# Patient Record
Sex: MALE | Race: WHITE | NOT HISPANIC OR LATINO
[De-identification: names, ages, dates, MRNs, and addresses within clinical notes are randomized per-mention and may not be internally consistent; named-entity substitution may affect disease eponyms.]

---

## 2018-05-21 ENCOUNTER — APPOINTMENT (OUTPATIENT)
Dept: SURGERY | Facility: CLINIC | Age: 53
End: 2018-05-21
Payer: COMMERCIAL

## 2018-05-21 VITALS
DIASTOLIC BLOOD PRESSURE: 110 MMHG | WEIGHT: 240 LBS | BODY MASS INDEX: 35.55 KG/M2 | HEART RATE: 77 BPM | TEMPERATURE: 98.7 F | SYSTOLIC BLOOD PRESSURE: 193 MMHG | OXYGEN SATURATION: 96 % | HEIGHT: 69 IN

## 2018-05-21 VITALS — SYSTOLIC BLOOD PRESSURE: 190 MMHG | DIASTOLIC BLOOD PRESSURE: 122 MMHG

## 2018-05-21 DIAGNOSIS — Z83.42 FAMILY HISTORY OF FAMILIAL HYPERCHOLESTEROLEMIA: ICD-10-CM

## 2018-05-21 DIAGNOSIS — Z83.511 FAMILY HISTORY OF GLAUCOMA: ICD-10-CM

## 2018-05-21 DIAGNOSIS — Z82.49 FAMILY HISTORY OF ISCHEMIC HEART DISEASE AND OTHER DISEASES OF THE CIRCULATORY SYSTEM: ICD-10-CM

## 2018-05-21 DIAGNOSIS — Z86.79 PERSONAL HISTORY OF OTHER DISEASES OF THE CIRCULATORY SYSTEM: ICD-10-CM

## 2018-05-21 DIAGNOSIS — Z83.3 FAMILY HISTORY OF DIABETES MELLITUS: ICD-10-CM

## 2018-05-21 DIAGNOSIS — Z78.9 OTHER SPECIFIED HEALTH STATUS: ICD-10-CM

## 2018-05-21 PROCEDURE — 99244 OFF/OP CNSLTJ NEW/EST MOD 40: CPT

## 2018-05-25 ENCOUNTER — OUTPATIENT (OUTPATIENT)
Dept: OUTPATIENT SERVICES | Facility: HOSPITAL | Age: 53
LOS: 1 days | End: 2018-05-25
Payer: COMMERCIAL

## 2018-05-25 ENCOUNTER — APPOINTMENT (OUTPATIENT)
Dept: CT IMAGING | Facility: CLINIC | Age: 53
End: 2018-05-25
Payer: COMMERCIAL

## 2018-05-25 DIAGNOSIS — R19.00 INTRA-ABDOMINAL AND PELVIC SWELLING, MASS AND LUMP, UNSPECIFIED SITE: ICD-10-CM

## 2018-05-25 DIAGNOSIS — R10.31 RIGHT LOWER QUADRANT PAIN: ICD-10-CM

## 2018-05-25 PROCEDURE — 74177 CT ABD & PELVIS W/CONTRAST: CPT | Mod: 26

## 2018-05-25 PROCEDURE — 74177 CT ABD & PELVIS W/CONTRAST: CPT

## 2018-05-25 PROCEDURE — 82565 ASSAY OF CREATININE: CPT

## 2018-06-15 ENCOUNTER — APPOINTMENT (OUTPATIENT)
Dept: SURGERY | Facility: CLINIC | Age: 53
End: 2018-06-15
Payer: COMMERCIAL

## 2018-06-15 VITALS
RESPIRATION RATE: 16 BRPM | SYSTOLIC BLOOD PRESSURE: 155 MMHG | HEART RATE: 79 BPM | OXYGEN SATURATION: 98 % | HEIGHT: 69 IN | TEMPERATURE: 98.5 F | BODY MASS INDEX: 35.55 KG/M2 | WEIGHT: 240 LBS | DIASTOLIC BLOOD PRESSURE: 76 MMHG

## 2018-06-15 VITALS — SYSTOLIC BLOOD PRESSURE: 147 MMHG | DIASTOLIC BLOOD PRESSURE: 91 MMHG

## 2018-06-15 DIAGNOSIS — K42.9 UMBILICAL HERNIA W/OUT OBSTRUCTION OR GANGRENE: ICD-10-CM

## 2018-06-15 PROCEDURE — 99214 OFFICE O/P EST MOD 30 MIN: CPT

## 2018-09-10 ENCOUNTER — APPOINTMENT (OUTPATIENT)
Dept: SURGERY | Facility: CLINIC | Age: 53
End: 2018-09-10

## 2020-01-06 ENCOUNTER — RESULT CHARGE (OUTPATIENT)
Age: 55
End: 2020-01-06

## 2020-01-06 ENCOUNTER — MED ADMIN CHARGE (OUTPATIENT)
Age: 55
End: 2020-01-06

## 2020-01-06 ENCOUNTER — APPOINTMENT (OUTPATIENT)
Dept: INTERNAL MEDICINE | Facility: CLINIC | Age: 55
End: 2020-01-06
Payer: COMMERCIAL

## 2020-01-06 VITALS
RESPIRATION RATE: 12 BRPM | HEIGHT: 69 IN | WEIGHT: 220 LBS | BODY MASS INDEX: 32.58 KG/M2 | SYSTOLIC BLOOD PRESSURE: 120 MMHG | HEART RATE: 83 BPM | DIASTOLIC BLOOD PRESSURE: 80 MMHG

## 2020-01-06 LAB — GLUCOSE BLDC GLUCOMTR-MCNC: 230

## 2020-01-06 PROCEDURE — 82962 GLUCOSE BLOOD TEST: CPT

## 2020-01-06 PROCEDURE — G0008: CPT

## 2020-01-06 PROCEDURE — 99203 OFFICE O/P NEW LOW 30 MIN: CPT | Mod: 25

## 2020-01-06 PROCEDURE — 90686 IIV4 VACC NO PRSV 0.5 ML IM: CPT

## 2020-01-06 RX ORDER — TOBRAMYCIN AND DEXAMETHASONE 3; 1 MG/ML; MG/ML
0.3-0.1 SUSPENSION/ DROPS OPHTHALMIC
Qty: 5 | Refills: 0 | Status: DISCONTINUED | COMMUNITY
Start: 2019-11-22

## 2020-01-06 RX ORDER — LOSARTAN POTASSIUM AND HYDROCHLOROTHIAZIDE 25; 100 MG/1; MG/1
100-25 TABLET ORAL
Qty: 30 | Refills: 0 | Status: DISCONTINUED | COMMUNITY
Start: 2019-06-17

## 2020-01-06 RX ORDER — ATORVASTATIN CALCIUM 10 MG/1
10 TABLET, FILM COATED ORAL
Qty: 21 | Refills: 0 | Status: DISCONTINUED | COMMUNITY
Start: 2019-10-01

## 2020-01-06 RX ORDER — GEMFIBROZIL 600 MG/1
600 TABLET, FILM COATED ORAL DAILY
Refills: 0 | Status: DISCONTINUED | COMMUNITY
Start: 2019-12-11 | End: 2020-01-06

## 2020-01-07 ENCOUNTER — RESULT REVIEW (OUTPATIENT)
Age: 55
End: 2020-01-07

## 2020-01-07 LAB
CREAT SPEC-SCNC: 75 MG/DL
MICROALBUMIN 24H UR DL<=1MG/L-MCNC: 1.2 MG/DL
MICROALBUMIN/CREAT 24H UR-RTO: 16 MG/G

## 2020-01-07 NOTE — HISTORY OF PRESENT ILLNESS
[FreeTextEntry1] : Follow up T2DM  [de-identified] : -PMH: T2DM, HTN, Transaminitis \par -SH: . 2 children. Account . >4 EtOH Beverages/day. \par \par YOANA is a 54 year M whom is here today for follow up newly Dx T2DM and to establish care with a new PMD\par Today, pt reports feeling well and is w/o complaints. \par \par -Vaccines: Needs PPSV 23, Shingles, TDap\par \par -T2DM: Currently on Metformin 500mg BID. (12/2019) A1c 12.1. This value is up from 1 year ago A1c 5.8. Reports that over the past few months their have been several life style changes with regards to diet and exercise. Not checking blood sugars at home. \par -HTN: Amlodipine 5mg, HCTZ 25mg & Losartan 100mg.\par -Transaminitis: Had CT Abd 2018 which demonstrated diffuse steatosis. Has not seen hepatologist in 10+ years.

## 2020-01-07 NOTE — HISTORY OF PRESENT ILLNESS
[FreeTextEntry1] : Follow up T2DM  [de-identified] : -PMH: T2DM, HTN, Transaminitis \par -SH: . 2 children. Account . >4 EtOH Beverages/day. \par \par YOANA is a 54 year M whom is here today for follow up newly Dx T2DM and to establish care with a new PMD\par Today, pt reports feeling well and is w/o complaints. \par \par -Vaccines: Needs PPSV 23, Shingles, TDap\par \par -T2DM: Currently on Metformin 500mg BID. (12/2019) A1c 12.1. This value is up from 1 year ago A1c 5.8. Reports that over the past few months their have been several life style changes with regards to diet and exercise. Not checking blood sugars at home. \par -HTN: Amlodipine 5mg, HCTZ 25mg & Losartan 100mg.\par -Transaminitis: Had CT Abd 2018 which demonstrated diffuse steatosis. Has not seen hepatologist in 10+ years.

## 2020-01-07 NOTE — ASSESSMENT
[FreeTextEntry1] : -PMH: T2DM, HTN, Transaminitis \par -SH: . 2 children. Account . >4 EtOH Beverages/day. \par \par YOANA is a 54 year M whom is here today for follow up newly Dx T2DM and to establish care with a new PMD\par Today, pt reports feeling well and is w/o complaints. \par \par -Dietary & Life style modifications w/ goal of weight loss as discussed\par -Increase Metofrmin to 1000mg BID\par -START Januvia 25mg QD (Will likely need to increase)\par -STOP Gemfibrozil & START Atorvastatin 40mg QHS. \par -F/u Urine Microalbumin obtained in office today \par -F/u w/ Diabetic Educator\par -RTO 1mo (PPSV 23, Hepatology Ref, Optho Ref, Review Blood sugars, EtOH use?, Umbilical Hernia?)

## 2020-02-04 ENCOUNTER — APPOINTMENT (OUTPATIENT)
Dept: INTERNAL MEDICINE | Facility: CLINIC | Age: 55
End: 2020-02-04

## 2020-02-18 ENCOUNTER — APPOINTMENT (OUTPATIENT)
Dept: INTERNAL MEDICINE | Facility: CLINIC | Age: 55
End: 2020-02-18
Payer: SELF-PAY

## 2020-02-18 VITALS
HEART RATE: 78 BPM | BODY MASS INDEX: 31.84 KG/M2 | WEIGHT: 215 LBS | HEIGHT: 69 IN | RESPIRATION RATE: 12 BRPM | DIASTOLIC BLOOD PRESSURE: 84 MMHG | SYSTOLIC BLOOD PRESSURE: 136 MMHG

## 2020-02-18 DIAGNOSIS — R10.31 RIGHT LOWER QUADRANT PAIN: ICD-10-CM

## 2020-02-18 DIAGNOSIS — G89.29 RIGHT LOWER QUADRANT PAIN: ICD-10-CM

## 2020-02-18 LAB — GLUCOSE BLDC GLUCOMTR-MCNC: 101

## 2020-02-18 PROCEDURE — 36415 COLL VENOUS BLD VENIPUNCTURE: CPT

## 2020-02-18 PROCEDURE — 82962 GLUCOSE BLOOD TEST: CPT

## 2020-02-18 PROCEDURE — 99213 OFFICE O/P EST LOW 20 MIN: CPT | Mod: 25

## 2020-02-18 RX ORDER — SITAGLIPTIN 25 MG/1
25 TABLET, FILM COATED ORAL DAILY
Qty: 30 | Refills: 0 | Status: DISCONTINUED | COMMUNITY
Start: 2020-01-06 | End: 2020-02-18

## 2020-02-18 NOTE — PHYSICAL EXAM
[Well Nourished] : well nourished [Well Developed] : well developed [No Acute Distress] : no acute distress [Well-Appearing] : well-appearing [Normal Sclera/Conjunctiva] : normal sclera/conjunctiva [PERRL] : pupils equal round and reactive to light [Normal Outer Ear/Nose] : the outer ears and nose were normal in appearance [EOMI] : extraocular movements intact [No Lymphadenopathy] : no lymphadenopathy [Normal Oropharynx] : the oropharynx was normal [No JVD] : no jugular venous distention [Supple] : supple [Thyroid Normal, No Nodules] : the thyroid was normal and there were no nodules present [No Respiratory Distress] : no respiratory distress  [Clear to Auscultation] : lungs were clear to auscultation bilaterally [No Accessory Muscle Use] : no accessory muscle use [Regular Rhythm] : with a regular rhythm [Normal Rate] : normal rate  [No Murmur] : no murmur heard [Normal S1, S2] : normal S1 and S2 [No Carotid Bruits] : no carotid bruits [No Varicosities] : no varicosities [No Abdominal Bruit] : a ~M bruit was not heard ~T in the abdomen [Pedal Pulses Present] : the pedal pulses are present [No Extremity Clubbing/Cyanosis] : no extremity clubbing/cyanosis [No Edema] : there was no peripheral edema [No Palpable Aorta] : no palpable aorta [Non Tender] : non-tender [Non-distended] : non-distended [Soft] : abdomen soft [No HSM] : no HSM [No Masses] : no abdominal mass palpated [Normal Bowel Sounds] : normal bowel sounds [Normal Anterior Cervical Nodes] : no anterior cervical lymphadenopathy [Normal Posterior Cervical Nodes] : no posterior cervical lymphadenopathy [No Spinal Tenderness] : no spinal tenderness [No CVA Tenderness] : no CVA  tenderness [Grossly Normal Strength/Tone] : grossly normal strength/tone [No Joint Swelling] : no joint swelling [No Rash] : no rash [No Focal Deficits] : no focal deficits [Coordination Grossly Intact] : coordination grossly intact [Normal Gait] : normal gait [Normal Affect] : the affect was normal [Deep Tendon Reflexes (DTR)] : deep tendon reflexes were 2+ and symmetric [Normal Insight/Judgement] : insight and judgment were intact

## 2020-02-19 LAB
ALBUMIN SERPL ELPH-MCNC: 4.5 G/DL
ALP BLD-CCNC: 77 U/L
ALT SERPL-CCNC: 101 U/L
ANION GAP SERPL CALC-SCNC: 15 MMOL/L
AST SERPL-CCNC: 74 U/L
BILIRUB SERPL-MCNC: 0.6 MG/DL
BUN SERPL-MCNC: 11 MG/DL
CALCIUM SERPL-MCNC: 9.5 MG/DL
CHLORIDE SERPL-SCNC: 102 MMOL/L
CHOLEST SERPL-MCNC: 145 MG/DL
CHOLEST/HDLC SERPL: 3.1 RATIO
CO2 SERPL-SCNC: 25 MMOL/L
CREAT SERPL-MCNC: 0.82 MG/DL
CREAT SPEC-SCNC: 159 MG/DL
ESTIMATED AVERAGE GLUCOSE: 183 MG/DL
GLUCOSE SERPL-MCNC: 103 MG/DL
HBA1C MFR BLD HPLC: 8 %
HDLC SERPL-MCNC: 47 MG/DL
LDLC SERPL CALC-MCNC: 59 MG/DL
MICROALBUMIN 24H UR DL<=1MG/L-MCNC: <1.2 MG/DL
MICROALBUMIN/CREAT 24H UR-RTO: NORMAL MG/G
POTASSIUM SERPL-SCNC: 3.6 MMOL/L
PROT SERPL-MCNC: 7.4 G/DL
SODIUM SERPL-SCNC: 142 MMOL/L
TRIGL SERPL-MCNC: 196 MG/DL

## 2020-02-19 NOTE — HISTORY OF PRESENT ILLNESS
[FreeTextEntry1] : Follow up T2DM  [de-identified] : -PMH: T2DM, HTN, Transaminitis \par -SH: . 2 children. Account . >4 EtOH Beverages/day. \par \par YOANA is a 54 year M whom is here today for follow up newly Dx T2DM\par Today, pt reports feeling well and is w/o complaints. \par \par -T2DM: Currently on Metformin to 1000mg BID which was inc 1mo ago and Januvia 25mg QD which was started 1mo ago. (12/2019) A1c 12.1. This value is up from 1 year ago A1c 5.8. Has continued to implement dietary/lifestyle modifications and has achieved an additional 5lb weight loss since last visit. Still has not picked up his glucometer or followed up with Diabetic educator and stopped taking januvia. \par -HTN/HLD: Currently on Atorvastatin 40mg QHS, Amlodipine 5mg, HCTZ 25mg & Losartan 100mg. \par -Transaminitis: Had CT Abd 2018 which demonstrated diffuse steatosis. Has not seen hepatologist in 10+ years. Consumes EtOH daily.

## 2020-02-19 NOTE — ASSESSMENT
[FreeTextEntry1] : -PMH: T2DM, HTN, Transaminitis \par -SH: . 2 children. Account . >4 EtOH Beverages/day. \par \par YOANA is a 54 year M whom is here today for follow up newly Dx T2DM\par Today, pt reports feeling well and is w/o complaints. \par \par -F/u labs drawn in office today\par -Further recs pending lab results\par -Continue with current medications\par -Dietary & Life style modifications w/ goal of weight loss as discussed\par -F/u w/ Optho for dilated diabetic eye exam\par -RTO 3mo (Hepatology Ref, PPSV 23, TDap, EtOH use?)

## 2020-02-19 NOTE — ADDENDUM
[FreeTextEntry1] : CMP: ALT//74\par A1c: 8.0\par \par #1 Transaminitis likely due to fatty liver disease and possibly als from him daily EtOH consumption. I recommend EtOH cessation and continued dietary/lifestyle modifications with goal of weight loss. Repeat CMP in 3mo. If these changes are not made their is a chance of development of liver cirrohisis.  \par #2 T2DM: Improved control but still not at goal. He can c/w Current Metformin BID. We will repeat labs in 3mo. \par \par in 3mo obtain CMP, CBC w/ Iron studies, TSH, Hepatitis Panel. If no improvement will send for MRI abdomen. Pending those results may need to send to hepatology.

## 2020-02-23 ENCOUNTER — TRANSCRIPTION ENCOUNTER (OUTPATIENT)
Age: 55
End: 2020-02-23

## 2020-02-25 ENCOUNTER — TRANSCRIPTION ENCOUNTER (OUTPATIENT)
Age: 55
End: 2020-02-25

## 2020-03-18 ENCOUNTER — TRANSCRIPTION ENCOUNTER (OUTPATIENT)
Age: 55
End: 2020-03-18

## 2020-04-02 ENCOUNTER — TRANSCRIPTION ENCOUNTER (OUTPATIENT)
Age: 55
End: 2020-04-02

## 2020-04-03 ENCOUNTER — APPOINTMENT (OUTPATIENT)
Dept: INTERNAL MEDICINE | Facility: CLINIC | Age: 55
End: 2020-04-03
Payer: COMMERCIAL

## 2020-04-03 VITALS
BODY MASS INDEX: 31.84 KG/M2 | HEIGHT: 69 IN | SYSTOLIC BLOOD PRESSURE: 140 MMHG | OXYGEN SATURATION: 98 % | WEIGHT: 215 LBS | HEART RATE: 78 BPM | DIASTOLIC BLOOD PRESSURE: 80 MMHG

## 2020-04-03 PROCEDURE — 96372 THER/PROPH/DIAG INJ SC/IM: CPT

## 2020-04-03 PROCEDURE — 99213 OFFICE O/P EST LOW 20 MIN: CPT | Mod: 25

## 2020-04-03 RX ORDER — DEXAMETHASONE SODIUM PHOSPHATE 10 MG/ML
10 INJECTION INTRAMUSCULAR; INTRAVENOUS
Refills: 0 | Status: COMPLETED | OUTPATIENT
Start: 2020-04-03

## 2020-04-03 RX ADMIN — DEXAMETHASONE SODIUM PHOSPHATE 0 MG/ML: 10 INJECTION INTRAMUSCULAR; INTRAVENOUS at 00:00

## 2020-04-03 NOTE — HISTORY OF PRESENT ILLNESS
[Rash (Location) ___] : rash on [unfilled] [Moderate] : moderate [___ Days ago] : [unfilled] days ago [Gradual] : started gradually [Constant] : constant [Cold Compresses] : cold compresses [Worsening] : worsening [de-identified] : Left wrist and left groin [FreeTextEntry1] : Rash began 1 day after outdoor gardening. Has h/o Poision Krystyna related contact dermatitis [FreeTextEntry2] : pruritus and erythema with continued spread [FreeTextEntry5] : Topical Cortisone [FreeTextEntry4] : Nothing particular

## 2020-04-03 NOTE — ASSESSMENT
[FreeTextEntry1] : Findings consistent with poison ivy related contact dermatitis that has continued to spread despite conservative OTC therapies.\par -IM injection of Decadron given in office\par -Patient to continue on prednisone taper over the next 2 weeks\par -Recommend use of OTC antihistamine such as Zyrtec\par -Avoid hot showers\par -Call if symptoms return after completion of prednisone taper as a prolonged prednisone taper may be warranted

## 2020-04-03 NOTE — PHYSICAL EXAM
[Normal] : no jugular venous distention, supple, no lymphadenopathy and the thyroid was normal and there were no nodules present [de-identified] : Erythematous rash left groin and left wrist

## 2020-05-20 ENCOUNTER — TRANSCRIPTION ENCOUNTER (OUTPATIENT)
Age: 55
End: 2020-05-20

## 2020-05-22 ENCOUNTER — APPOINTMENT (OUTPATIENT)
Dept: INTERNAL MEDICINE | Facility: CLINIC | Age: 55
End: 2020-05-22
Payer: COMMERCIAL

## 2020-05-22 VITALS
HEART RATE: 96 BPM | HEIGHT: 69 IN | WEIGHT: 215 LBS | OXYGEN SATURATION: 98 % | DIASTOLIC BLOOD PRESSURE: 76 MMHG | SYSTOLIC BLOOD PRESSURE: 134 MMHG | RESPIRATION RATE: 14 BRPM | BODY MASS INDEX: 31.84 KG/M2

## 2020-05-22 LAB
ALBUMIN SERPL ELPH-MCNC: 4.4 G/DL
ALP BLD-CCNC: 65 U/L
ALT SERPL-CCNC: 53 U/L
ANION GAP SERPL CALC-SCNC: 13 MMOL/L
AST SERPL-CCNC: 51 U/L
BASOPHILS # BLD AUTO: 0.04 K/UL
BASOPHILS NFR BLD AUTO: 0.6 %
BILIRUB SERPL-MCNC: 1 MG/DL
BUN SERPL-MCNC: 15 MG/DL
CALCIUM SERPL-MCNC: 9.3 MG/DL
CHLORIDE SERPL-SCNC: 104 MMOL/L
CO2 SERPL-SCNC: 25 MMOL/L
CREAT SERPL-MCNC: 0.8 MG/DL
EOSINOPHIL # BLD AUTO: 0.49 K/UL
EOSINOPHIL NFR BLD AUTO: 7.7 %
ESTIMATED AVERAGE GLUCOSE: 134 MG/DL
FERRITIN SERPL-MCNC: 286 NG/ML
GLUCOSE SERPL-MCNC: 123 MG/DL
HAV IGM SER QL: NONREACTIVE
HBA1C MFR BLD HPLC: 6.3 %
HBV CORE IGM SER QL: NONREACTIVE
HBV SURFACE AG SER QL: NONREACTIVE
HCT VFR BLD CALC: 43.1 %
HCV AB SER QL: NONREACTIVE
HCV S/CO RATIO: 0.15 S/CO
HGB BLD-MCNC: 14.3 G/DL
IMM GRANULOCYTES NFR BLD AUTO: 0.2 %
IRON SATN MFR SERPL: 50 %
IRON SERPL-MCNC: 156 UG/DL
LYMPHOCYTES # BLD AUTO: 2.44 K/UL
LYMPHOCYTES NFR BLD AUTO: 38.2 %
MAN DIFF?: NORMAL
MCHC RBC-ENTMCNC: 31.1 PG
MCHC RBC-ENTMCNC: 33.2 GM/DL
MCV RBC AUTO: 93.7 FL
MONOCYTES # BLD AUTO: 0.67 K/UL
MONOCYTES NFR BLD AUTO: 10.5 %
NEUTROPHILS # BLD AUTO: 2.73 K/UL
NEUTROPHILS NFR BLD AUTO: 42.8 %
PLATELET # BLD AUTO: 154 K/UL
POTASSIUM SERPL-SCNC: 4.4 MMOL/L
PROT SERPL-MCNC: 6.6 G/DL
RBC # BLD: 4.6 M/UL
RBC # FLD: 12.8 %
SODIUM SERPL-SCNC: 142 MMOL/L
TIBC SERPL-MCNC: 316 UG/DL
TSH SERPL-ACNC: 1.16 UIU/ML
UIBC SERPL-MCNC: 159 UG/DL
WBC # FLD AUTO: 6.38 K/UL

## 2020-05-22 PROCEDURE — 99214 OFFICE O/P EST MOD 30 MIN: CPT

## 2020-05-22 RX ORDER — TRIAMCINOLONE ACETONIDE 1 MG/G
0.1 CREAM TOPICAL 3 TIMES DAILY
Qty: 1 | Refills: 0 | Status: DISCONTINUED | COMMUNITY
Start: 2020-04-03 | End: 2020-05-22

## 2020-05-22 RX ORDER — PREDNISONE 10 MG/1
10 TABLET ORAL
Qty: 18 | Refills: 0 | Status: DISCONTINUED | COMMUNITY
Start: 2020-04-03 | End: 2020-05-22

## 2020-05-22 NOTE — HISTORY OF PRESENT ILLNESS
[FreeTextEntry1] : Follow up T2DM  [de-identified] : -PMH: T2DM, HTN, Transaminitis 2/2 Hepatic Steatosis\par -SH: . 2 children. Account . >4 EtOH Beverages/day. \par \par YOANA is a 54 year M whom is here today for follow up newly Dx T2DM\par Today, pt reports feeling well and is w/o complaints. \par He denies any changes since our last f/u\par Pt obtained labs prior to todays appointment and thus labs were reviewed with patient in detail\par \par -T2DM: (5/2020) A1c: 6.3. Remains on Metformin to 1000mg BID. Does not monitor blood sugars at home. \par -HTN/HLD: Remains on Atorvastatin 40mg QHS, Amlodipine 5mg, HCTZ 25mg & Losartan 100mg. Does not follow w/ cardio. Denies Sxs today.\par -Hepatic Steatosis: (2018) CT Abd demonstrated diffuse steatosis. Consumes EtOH daily.

## 2020-05-22 NOTE — ASSESSMENT
[FreeTextEntry1] : -PMH: T2DM, HTN, Transaminitis 2/2 Hepatic Steatosis\par -SH: . 2 children. Account . >4 EtOH Beverages/day. \par \par YOANA is a 54 year M whom is here today for follow up newly Dx T2DM\par \par -F/u labs drawn in office today\par -Further recs pending lab results\par -Decrease Metformin to 850bid\par -Dietary & Life style modifications w/ goal of weight loss as discussed\par -F/u w/ Optho for dilated diabetic eye exam\par -RTO 3mo (Labs: CMP, A1c, Lipid. Discuss PPSV 23 & TDap. Schedule yearly physical)

## 2020-07-08 ENCOUNTER — APPOINTMENT (OUTPATIENT)
Dept: INTERNAL MEDICINE | Facility: CLINIC | Age: 55
End: 2020-07-08
Payer: COMMERCIAL

## 2020-07-08 VITALS
BODY MASS INDEX: 33.33 KG/M2 | DIASTOLIC BLOOD PRESSURE: 80 MMHG | TEMPERATURE: 98.5 F | WEIGHT: 225 LBS | HEIGHT: 69 IN | SYSTOLIC BLOOD PRESSURE: 125 MMHG | HEART RATE: 90 BPM

## 2020-07-08 PROCEDURE — 99213 OFFICE O/P EST LOW 20 MIN: CPT | Mod: 25

## 2020-07-08 PROCEDURE — 36415 COLL VENOUS BLD VENIPUNCTURE: CPT

## 2020-07-08 RX ORDER — AMLODIPINE BESYLATE 5 MG/1
5 TABLET ORAL DAILY
Qty: 90 | Refills: 1 | Status: DISCONTINUED | COMMUNITY
Start: 2019-12-11 | End: 2020-07-08

## 2020-07-08 RX ORDER — DEXAMETHASONE SODIUM PHOSPHATE 10 MG/ML
10 INJECTION INTRAMUSCULAR; INTRAVENOUS
Refills: 0 | Status: COMPLETED | OUTPATIENT
Start: 2020-07-08

## 2020-07-08 RX ADMIN — DEXAMETHASONE SODIUM PHOSPHATE 0 MG/ML: 10 INJECTION INTRAMUSCULAR; INTRAVENOUS at 00:00

## 2020-07-09 ENCOUNTER — TRANSCRIPTION ENCOUNTER (OUTPATIENT)
Age: 55
End: 2020-07-09

## 2020-07-09 LAB
SARS-COV-2 IGG SERPL IA-ACNC: 71.4 AU/ML
SARS-COV-2 IGG SERPL QL IA: POSITIVE

## 2020-07-09 NOTE — PHYSICAL EXAM
[No Acute Distress] : no acute distress [No Respiratory Distress] : no respiratory distress  [Clear to Auscultation] : lungs were clear to auscultation bilaterally [Normal Rate] : normal rate  [Regular Rhythm] : with a regular rhythm [Normal Affect] : the affect was normal [Normal Mood] : the mood was normal [de-identified] : +Vesicular pustular eruption bilateral arms/lower abdomen, no secondary infection

## 2020-07-09 NOTE — ASSESSMENT
[FreeTextEntry1] : Venipuncture done in our office, Labs sent out.Dexamethasone IM given/ Medrol Dosepak and Cutivate prescribed, patient is aware that steroids can raise his blood sugar but has not had issue in the past w/pred. Patient will call if symptoms persist or worsen and return to the office as scheduled for regular followup\par \par \par Dr. Schrader was present in office building while I examined patient\par

## 2020-07-09 NOTE — HISTORY OF PRESENT ILLNESS
[FreeTextEntry8] : Dr. WAYNE bridges NEW to me presents c/o\par \par #1.Rash bilateral arms/abdomen for 10 days, it is pruritic. The patient was doing yard work. Patient tried a couple leftover prednisone that he had at home. Patient has no systemic symptoms i.e. fever\par \par #2. Request COVID antibody testing while here

## 2020-07-16 ENCOUNTER — TRANSCRIPTION ENCOUNTER (OUTPATIENT)
Age: 55
End: 2020-07-16

## 2020-07-17 ENCOUNTER — TRANSCRIPTION ENCOUNTER (OUTPATIENT)
Age: 55
End: 2020-07-17

## 2020-07-24 ENCOUNTER — APPOINTMENT (OUTPATIENT)
Dept: INTERNAL MEDICINE | Facility: CLINIC | Age: 55
End: 2020-07-24
Payer: COMMERCIAL

## 2020-07-24 VITALS
HEART RATE: 76 BPM | RESPIRATION RATE: 14 BRPM | TEMPERATURE: 97.6 F | HEIGHT: 69 IN | BODY MASS INDEX: 33.47 KG/M2 | SYSTOLIC BLOOD PRESSURE: 142 MMHG | OXYGEN SATURATION: 98 % | DIASTOLIC BLOOD PRESSURE: 80 MMHG | WEIGHT: 226 LBS

## 2020-07-24 PROCEDURE — 96372 THER/PROPH/DIAG INJ SC/IM: CPT

## 2020-07-24 PROCEDURE — 99213 OFFICE O/P EST LOW 20 MIN: CPT | Mod: 25

## 2020-07-24 RX ORDER — METHYLPREDNISOLONE 4 MG/1
4 TABLET ORAL
Qty: 1 | Refills: 0 | Status: DISCONTINUED | COMMUNITY
Start: 2020-07-08 | End: 2020-07-24

## 2020-07-24 RX ORDER — DEXAMETHASONE SODIUM PHOSPHATE 10 MG/ML
10 INJECTION INTRAMUSCULAR; INTRAVENOUS
Refills: 0 | Status: COMPLETED | OUTPATIENT
Start: 2020-07-24

## 2020-07-24 RX ADMIN — DEXAMETHASONE SODIUM PHOSPHATE 0 MG/ML: 10 INJECTION INTRAMUSCULAR; INTRAVENOUS at 00:00

## 2020-07-24 NOTE — HISTORY OF PRESENT ILLNESS
[Rash (Location) ___] : rash on [unfilled] [Mild] : mild [Gradual] : started gradually [___ Weeks ago] :  [unfilled] weeks ago [Antihistamines] : antihistamines [Constant] : constant [Stable] : stable [de-identified] : erythematous rash b/l UE. It started at the beginning of July. He was seen in our office by a covering provider on July 8 whom started pt on medrol dose pack. Pt reports improvement of rash but is still with presence of rash that is pruritic

## 2020-07-24 NOTE — PHYSICAL EXAM
[Normal] : no acute distress, well nourished, well developed and well-appearing [de-identified] : erythemaouts

## 2020-07-24 NOTE — ASSESSMENT
[FreeTextEntry1] : persistent poision ivy w/ some improvement\par Will place on longer steroid taper\par Decadron IM administered in office to  LUE\par Call if fails to resolve with recommended therapy

## 2020-07-29 ENCOUNTER — RX RENEWAL (OUTPATIENT)
Age: 55
End: 2020-07-29

## 2020-10-05 ENCOUNTER — RX RENEWAL (OUTPATIENT)
Age: 55
End: 2020-10-05

## 2020-11-09 ENCOUNTER — RX RENEWAL (OUTPATIENT)
Age: 55
End: 2020-11-09

## 2020-11-10 ENCOUNTER — RX RENEWAL (OUTPATIENT)
Age: 55
End: 2020-11-10

## 2021-01-11 ENCOUNTER — TRANSCRIPTION ENCOUNTER (OUTPATIENT)
Age: 56
End: 2021-01-11

## 2021-01-13 LAB
ALBUMIN SERPL ELPH-MCNC: 4.6 G/DL
ALP BLD-CCNC: 91 U/L
ALT SERPL-CCNC: 90 U/L
ANION GAP SERPL CALC-SCNC: 16 MMOL/L
AST SERPL-CCNC: 61 U/L
BASOPHILS # BLD AUTO: 0.07 K/UL
BASOPHILS NFR BLD AUTO: 1 %
BILIRUB SERPL-MCNC: 0.8 MG/DL
BUN SERPL-MCNC: 12 MG/DL
CALCIUM SERPL-MCNC: 9.9 MG/DL
CHLORIDE SERPL-SCNC: 100 MMOL/L
CHOLEST SERPL-MCNC: 139 MG/DL
CO2 SERPL-SCNC: 24 MMOL/L
CREAT SERPL-MCNC: 0.99 MG/DL
CREAT SPEC-SCNC: 225 MG/DL
EOSINOPHIL # BLD AUTO: 0.19 K/UL
EOSINOPHIL NFR BLD AUTO: 2.8 %
ESTIMATED AVERAGE GLUCOSE: 206 MG/DL
GLUCOSE SERPL-MCNC: 192 MG/DL
HBA1C MFR BLD HPLC: 8.8 %
HCT VFR BLD CALC: 48.3 %
HDLC SERPL-MCNC: 38 MG/DL
HGB BLD-MCNC: 15.9 G/DL
IMM GRANULOCYTES NFR BLD AUTO: 0.1 %
LDLC SERPL CALC-MCNC: 73 MG/DL
LYMPHOCYTES # BLD AUTO: 3.03 K/UL
LYMPHOCYTES NFR BLD AUTO: 45 %
MAN DIFF?: NORMAL
MCHC RBC-ENTMCNC: 30.2 PG
MCHC RBC-ENTMCNC: 32.9 GM/DL
MCV RBC AUTO: 91.7 FL
MICROALBUMIN 24H UR DL<=1MG/L-MCNC: 2.3 MG/DL
MICROALBUMIN/CREAT 24H UR-RTO: 10 MG/G
MONOCYTES # BLD AUTO: 0.83 K/UL
MONOCYTES NFR BLD AUTO: 12.3 %
NEUTROPHILS # BLD AUTO: 2.61 K/UL
NEUTROPHILS NFR BLD AUTO: 38.8 %
NONHDLC SERPL-MCNC: 101 MG/DL
PLATELET # BLD AUTO: 209 K/UL
POTASSIUM SERPL-SCNC: 4.1 MMOL/L
PROT SERPL-MCNC: 7.3 G/DL
PSA SERPL-MCNC: 2.1 NG/ML
RBC # BLD: 5.27 M/UL
RBC # FLD: 12.6 %
SODIUM SERPL-SCNC: 140 MMOL/L
TRIGL SERPL-MCNC: 137 MG/DL
WBC # FLD AUTO: 6.74 K/UL

## 2021-01-18 ENCOUNTER — RX RENEWAL (OUTPATIENT)
Age: 56
End: 2021-01-18

## 2021-01-19 ENCOUNTER — NON-APPOINTMENT (OUTPATIENT)
Age: 56
End: 2021-01-19

## 2021-01-19 ENCOUNTER — APPOINTMENT (OUTPATIENT)
Dept: INTERNAL MEDICINE | Facility: CLINIC | Age: 56
End: 2021-01-19
Payer: COMMERCIAL

## 2021-01-19 VITALS
HEART RATE: 76 BPM | DIASTOLIC BLOOD PRESSURE: 90 MMHG | RESPIRATION RATE: 14 BRPM | WEIGHT: 230 LBS | OXYGEN SATURATION: 96 % | BODY MASS INDEX: 34.07 KG/M2 | SYSTOLIC BLOOD PRESSURE: 134 MMHG | TEMPERATURE: 97.2 F | HEIGHT: 69 IN

## 2021-01-19 DIAGNOSIS — L23.7 ALLERGIC CONTACT DERMATITIS DUE TO PLANTS, EXCEPT FOOD: ICD-10-CM

## 2021-01-19 DIAGNOSIS — Z11.59 ENCOUNTER FOR SCREENING FOR OTHER VIRAL DISEASES: ICD-10-CM

## 2021-01-19 PROCEDURE — G0442 ANNUAL ALCOHOL SCREEN 15 MIN: CPT

## 2021-01-19 PROCEDURE — G0444 DEPRESSION SCREEN ANNUAL: CPT

## 2021-01-19 PROCEDURE — 93000 ELECTROCARDIOGRAM COMPLETE: CPT | Mod: 59

## 2021-01-19 PROCEDURE — 99072 ADDL SUPL MATRL&STAF TM PHE: CPT

## 2021-01-19 PROCEDURE — 99396 PREV VISIT EST AGE 40-64: CPT | Mod: 25

## 2021-01-19 PROCEDURE — 90686 IIV4 VACC NO PRSV 0.5 ML IM: CPT

## 2021-01-19 PROCEDURE — G0008: CPT

## 2021-01-19 RX ORDER — FLUTICASONE PROPIONATE 0.5 MG/G
0.05 CREAM TOPICAL TWICE DAILY
Qty: 1 | Refills: 0 | Status: DISCONTINUED | COMMUNITY
Start: 2020-07-08 | End: 2021-01-19

## 2021-01-19 NOTE — HEALTH RISK ASSESSMENT
[Very Good] : ~his/her~  mood as very good [Yes] : Yes [No] : In the past 12 months have you used drugs other than those required for medical reasons? No [0] : 2) Feeling down, depressed, or hopeless: Not at all (0) [] :  [# Of Children ___] : has [unfilled] children [Reviewed no changes] : Reviewed no changes [No Retinopathy] : No retinopathy [2 - 3 times a week (3 pts)] : 2 - 3  times a week (3 points) [5 or 6 (2 pts)] : 5 or 6 (2  points) [Weekly (3 pts)] : Weekly (3 points) [Patient declined colonoscopy] : Patient declined colonoscopy [With Family] : lives with family [Employed] : employed [] : No [Audit-CScore] : 8 [[id number]] : 0 [EyeExamDate] : 01/20 [Reports changes in hearing] : Reports no changes in hearing [Reports changes in vision] : Reports no changes in vision [ColonoscopyDate] : 01/21 [AdvancecareDate] : 01/21

## 2021-01-19 NOTE — HISTORY OF PRESENT ILLNESS
[FreeTextEntry1] : ANnual well visit [de-identified] : -PMH: T2DM, HTN, Transaminitis 2/2 Hepatic Steatosis\par -SH: . 2 children. Account . >4 EtOH Beverages/day. Non-Smoker. \par \par YOANA is a 55 year M whom is here today for an annual well check\par Today, pt reports feeling well and is w/o complaints. \par He denies any changes since our last f/u visit\par He obtained ordered labs to review at today's visit on 1/13/21. (CBC, PSA, Lipid Panel, CMP, A1c, Urine Microalbumin). Lab results reviewed with patient. \par Consents to Flu vaccine today\par \par Specialists Involved:\par -None\par \par -Vaccines: Needs PPSV 23, Shingles, TDap (Declines at this time)\par -Colonoscopy: Still needs to f/u w/ GI\par -FH of Prostate, Colon, breast or ovarian CA: None known\par \par -T2DM: Remains on Metformin to 850mg BID. On Statin but not Aspirin. Does not monitor blood sugars at home. (1/2021) A1c 8.8. He does admit to poor diet, lack of exercise and weight gain and med non-compliance as contributing factors. (1/2020) Optho Eval: No Retinopathy. No other reported changes. \par \par -HTN: Remains on HCTZ 25mg & Losartan 100mg. Does not follow w/ cardio. No reported changes. \par -HLD: Remains on Atorvastatin 40mg QHS. No reported changes. \par \par -Hepatic Steatosis: (2018) CT Abd demonstrated diffuse steatosis. He admits to consuming EtOH daily. Not interested in cutting back. No other reported changes.

## 2021-01-19 NOTE — PHYSICAL EXAM
[No Acute Distress] : no acute distress [Well Nourished] : well nourished [Well Developed] : well developed [Well-Appearing] : well-appearing [Normal Sclera/Conjunctiva] : normal sclera/conjunctiva [PERRL] : pupils equal round and reactive to light [EOMI] : extraocular movements intact [Normal Outer Ear/Nose] : the outer ears and nose were normal in appearance [Normal Oropharynx] : the oropharynx was normal [No JVD] : no jugular venous distention [No Lymphadenopathy] : no lymphadenopathy [Supple] : supple [Thyroid Normal, No Nodules] : the thyroid was normal and there were no nodules present [No Respiratory Distress] : no respiratory distress  [No Accessory Muscle Use] : no accessory muscle use [Clear to Auscultation] : lungs were clear to auscultation bilaterally [Normal Rate] : normal rate  [Regular Rhythm] : with a regular rhythm [Normal S1, S2] : normal S1 and S2 [No Murmur] : no murmur heard [No Carotid Bruits] : no carotid bruits [No Abdominal Bruit] : a ~M bruit was not heard ~T in the abdomen [No Varicosities] : no varicosities [Pedal Pulses Present] : the pedal pulses are present [No Palpable Aorta] : no palpable aorta [No Edema] : there was no peripheral edema [No Extremity Clubbing/Cyanosis] : no extremity clubbing/cyanosis [Soft] : abdomen soft [Non Tender] : non-tender [Non-distended] : non-distended [No Masses] : no abdominal mass palpated [Normal Bowel Sounds] : normal bowel sounds [No HSM] : no HSM [Normal Posterior Cervical Nodes] : no posterior cervical lymphadenopathy [Normal Anterior Cervical Nodes] : no anterior cervical lymphadenopathy [No CVA Tenderness] : no CVA  tenderness [No Spinal Tenderness] : no spinal tenderness [No Joint Swelling] : no joint swelling [Grossly Normal Strength/Tone] : grossly normal strength/tone [No Rash] : no rash [Coordination Grossly Intact] : coordination grossly intact [No Focal Deficits] : no focal deficits [Normal Gait] : normal gait [Normal Affect] : the affect was normal [Deep Tendon Reflexes (DTR)] : deep tendon reflexes were 2+ and symmetric [Normal Insight/Judgement] : insight and judgment were intact [Comprehensive Foot Exam Normal] : Right and left foot were examined and both feet are normal. No ulcers in either foot. Toes are normal and with full ROM.  Normal tactile sensation with monofilament testing throughout both feet

## 2021-01-19 NOTE — ASSESSMENT
[FreeTextEntry1] : -PMH: T2DM, HTN, Transaminitis 2/2 Hepatic Steatosis\par -SH: . 2 children. Account . >4 EtOH Beverages/day. \par \par YOANA is a 54 year M whom is here today for CPE\par \par Flu Vaccine administered in office today\par \par -F/u w/ Uro (ED non-responsive to Sildenaphil)\par -Still needs to f/u w/ GI (Colonoscopy)\par -START Aspirin 81mg\par -Still needs to f/u w/ Optho (Dilated diabetic eye exam)\par -RTO 3mo (A1c) or sooner if needed PPSV 23, Shingles, TDap

## 2021-05-06 ENCOUNTER — APPOINTMENT (OUTPATIENT)
Dept: DERMATOLOGY | Facility: CLINIC | Age: 56
End: 2021-05-06
Payer: COMMERCIAL

## 2021-05-06 PROCEDURE — 99203 OFFICE O/P NEW LOW 30 MIN: CPT

## 2021-05-06 PROCEDURE — D0125: CPT

## 2021-05-06 PROCEDURE — 99072 ADDL SUPL MATRL&STAF TM PHE: CPT

## 2021-07-20 ENCOUNTER — TRANSCRIPTION ENCOUNTER (OUTPATIENT)
Age: 56
End: 2021-07-20

## 2021-07-21 ENCOUNTER — RX RENEWAL (OUTPATIENT)
Age: 56
End: 2021-07-21

## 2021-07-23 LAB
ALBUMIN SERPL ELPH-MCNC: 4.4 G/DL
ALP BLD-CCNC: 100 U/L
ALT SERPL-CCNC: 63 U/L
ANION GAP SERPL CALC-SCNC: 14 MMOL/L
AST SERPL-CCNC: 39 U/L
BILIRUB SERPL-MCNC: 1.1 MG/DL
BUN SERPL-MCNC: 18 MG/DL
CALCIUM SERPL-MCNC: 9.6 MG/DL
CHLORIDE SERPL-SCNC: 100 MMOL/L
CHOLEST SERPL-MCNC: 148 MG/DL
CO2 SERPL-SCNC: 26 MMOL/L
CREAT SERPL-MCNC: 0.93 MG/DL
ESTIMATED AVERAGE GLUCOSE: 226 MG/DL
GLUCOSE SERPL-MCNC: 212 MG/DL
HBA1C MFR BLD HPLC: 9.5 %
HDLC SERPL-MCNC: 40 MG/DL
LDLC SERPL CALC-MCNC: 66 MG/DL
NONHDLC SERPL-MCNC: 108 MG/DL
POTASSIUM SERPL-SCNC: 3.7 MMOL/L
PROT SERPL-MCNC: 6.9 G/DL
SODIUM SERPL-SCNC: 140 MMOL/L
TRIGL SERPL-MCNC: 207 MG/DL

## 2021-07-26 ENCOUNTER — APPOINTMENT (OUTPATIENT)
Dept: INTERNAL MEDICINE | Facility: CLINIC | Age: 56
End: 2021-07-26
Payer: COMMERCIAL

## 2021-07-26 VITALS
HEART RATE: 101 BPM | DIASTOLIC BLOOD PRESSURE: 76 MMHG | HEIGHT: 69 IN | OXYGEN SATURATION: 98 % | SYSTOLIC BLOOD PRESSURE: 148 MMHG | RESPIRATION RATE: 14 BRPM | BODY MASS INDEX: 33.62 KG/M2 | TEMPERATURE: 96.8 F | WEIGHT: 227 LBS

## 2021-07-26 DIAGNOSIS — Z92.29 PERSONAL HISTORY OF OTHER DRUG THERAPY: ICD-10-CM

## 2021-07-26 PROCEDURE — 99215 OFFICE O/P EST HI 40 MIN: CPT

## 2021-07-26 PROCEDURE — 99072 ADDL SUPL MATRL&STAF TM PHE: CPT

## 2021-07-26 NOTE — ASSESSMENT
[FreeTextEntry1] : -PMH: T2DM, HTN, Transaminitis 2/2 Hepatic Steatosis\par -SH: . 2 children. Account . >4 EtOH Beverages/day. \par \par YOANA is a 54 year M whom is here today for f/u T2DM\par \par Specialists:\par None\par \par -Labs drawn prior to todays visit on July 23 were reviewed in office today w/ pt\par -Still needs to f/u w/ Uro (ED non-responsive to Sildenaphil)\par -Still needs to f/u w/ GI (Colonoscopy)\par -Still needs to f/u w/ Optho (Dilated diabetic eye exam)\par -He will reach out to me in 2 weeks with a Glucose log to determine if adding on a third agent is needed for DM management \par -RTO 1mo for f/u uncontrolled T2DM & HTN or sooner if needed\par

## 2021-07-26 NOTE — HISTORY OF PRESENT ILLNESS
[FreeTextEntry1] : T2DM, HTN, HyperTG [de-identified] : -PMH: T2DM, HTN, Transaminitis 2/2 Hepatic Steatosis\par -SH: . 2 children. Account . >4 EtOH Beverages/day. Non-Smoker. \par \par YOANA is a 55 year M whom is here today for f/u HTN, HLD & T2DM\par Today, pt reports feeling well and is w/o complaints\par He denies any changes since our last f/u visit\par He obtained ordered labs to review at today's visit on July 23\par \par -T2DM: Remains on Metformin 1000mg BID. On ASA & Statin. BID BG Monitoring (Avg AM Fasting 180-200). (7/2021) A1c 9.5. He does admit to poor diet, lack of exercise and weight gain and med non-compliance as contributing factors. (1/2020) Optho Eval: No Retinopathy. No other reported changes. \par \par -HTN: Remains on HCTZ 25mg & Losartan 100mg. Does not follow w/ cardio. No reported changes. \par -HLD: Remains on Atorvastatin 40mg QHS. No reported changes. \par \par -Hepatic Steatosis: (2018) CT Abd demonstrated diffuse steatosis. He admits to consuming EtOH daily. Not interested in cutting back. No other reported changes.

## 2021-07-27 ENCOUNTER — TRANSCRIPTION ENCOUNTER (OUTPATIENT)
Age: 56
End: 2021-07-27

## 2021-07-27 RX ORDER — EMPAGLIFLOZIN 25 MG/1
25 TABLET, FILM COATED ORAL
Qty: 90 | Refills: 0 | Status: DISCONTINUED | COMMUNITY
Start: 2021-07-26 | End: 2021-07-27

## 2021-08-04 ENCOUNTER — TRANSCRIPTION ENCOUNTER (OUTPATIENT)
Age: 56
End: 2021-08-04

## 2021-08-09 ENCOUNTER — TRANSCRIPTION ENCOUNTER (OUTPATIENT)
Age: 56
End: 2021-08-09

## 2021-08-10 ENCOUNTER — TRANSCRIPTION ENCOUNTER (OUTPATIENT)
Age: 56
End: 2021-08-10

## 2021-08-19 ENCOUNTER — RX RENEWAL (OUTPATIENT)
Age: 56
End: 2021-08-19

## 2021-09-01 ENCOUNTER — RX RENEWAL (OUTPATIENT)
Age: 56
End: 2021-09-01

## 2021-09-01 ENCOUNTER — TRANSCRIPTION ENCOUNTER (OUTPATIENT)
Age: 56
End: 2021-09-01

## 2022-03-14 ENCOUNTER — TRANSCRIPTION ENCOUNTER (OUTPATIENT)
Age: 57
End: 2022-03-14

## 2022-03-16 ENCOUNTER — APPOINTMENT (OUTPATIENT)
Dept: INTERNAL MEDICINE | Facility: CLINIC | Age: 57
End: 2022-03-16
Payer: COMMERCIAL

## 2022-03-16 ENCOUNTER — NON-APPOINTMENT (OUTPATIENT)
Age: 57
End: 2022-03-16

## 2022-03-16 VITALS
OXYGEN SATURATION: 98 % | TEMPERATURE: 97.2 F | HEIGHT: 69 IN | HEART RATE: 84 BPM | DIASTOLIC BLOOD PRESSURE: 98 MMHG | RESPIRATION RATE: 16 BRPM | SYSTOLIC BLOOD PRESSURE: 152 MMHG | BODY MASS INDEX: 32.88 KG/M2 | WEIGHT: 222 LBS

## 2022-03-16 DIAGNOSIS — L24.1 IRRITANT CONTACT DERMATITIS DUE TO OILS AND GREASES: ICD-10-CM

## 2022-03-16 PROCEDURE — 36415 COLL VENOUS BLD VENIPUNCTURE: CPT

## 2022-03-16 PROCEDURE — 99214 OFFICE O/P EST MOD 30 MIN: CPT | Mod: 25

## 2022-03-16 PROCEDURE — G0447 BEHAVIOR COUNSEL OBESITY 15M: CPT | Mod: 59

## 2022-03-16 RX ORDER — SITAGLIPTIN 50 MG/1
50 TABLET, FILM COATED ORAL DAILY
Qty: 15 | Refills: 0 | Status: DISCONTINUED | COMMUNITY
Start: 2021-07-27 | End: 2022-03-16

## 2022-03-16 RX ORDER — CLOBETASOL PROPIONATE 0.5 MG/G
0.05 CREAM TOPICAL
Qty: 2 | Refills: 1 | Status: DISCONTINUED | COMMUNITY
Start: 2021-07-26 | End: 2022-03-16

## 2022-03-16 RX ORDER — SILDENAFIL 100 MG/1
100 TABLET, FILM COATED ORAL
Refills: 0 | Status: DISCONTINUED | COMMUNITY
Start: 2019-10-01 | End: 2022-03-16

## 2022-03-16 RX ORDER — ASPIRIN ENTERIC COATED TABLETS 81 MG 81 MG/1
81 TABLET, DELAYED RELEASE ORAL
Refills: 0 | Status: ACTIVE | COMMUNITY
Start: 2021-01-19

## 2022-03-16 NOTE — HISTORY OF PRESENT ILLNESS
[FreeTextEntry1] : T2DM, HTN, HyperTG [de-identified] : -PMH: T2DM, HTN, HLD, Transaminitis 2/2 Hepatic Steatosis\par -SH: . 2 children. Account . >4 EtOH Beverages/day. Non-Smoker. \par \par YOANA is a 56 year M whom is here today for f/u T2DM, HTN, HLD\par Today, pt reports feeling well and is w/o complaints\par He denies any changes since our last f/u visit\par He obtained ordered labs to review at today's visit on July 23\par \par -T2DM: Remains on Metformin 1000mg BID. Pt self DCd Januvia 50mg QD. On ASA & Statin. Compliant w/ BID BG Monitoring (Avg AM Fasting 180-200). (7/2021) A1c 9.5. (1/2020) Optho Eval: No Retinopathy. No reported changes. \par \par -HTN: Remains on HCTZ 25mg & Losartan 100mg. Does not follow w/ cardio. No reported changes. \par -HLD: Self DCd Atorvastatin 40mg QHS. No reported changes. \par \par -Hepatic Steatosis: (2018) CT Abd demonstrated diffuse steatosis. He admits to consuming EtOH daily. Not interested in cutting back. No other reported changes.

## 2022-03-16 NOTE — ASSESSMENT
[FreeTextEntry1] : -PMH: T2DM, HTN, HLD, Transaminitis 2/2 Hepatic Steatosis\par -SH: . 2 children. Account . >4 EtOH Beverages/day. Non-Smoker. \par \par YOANA is a 56 year M whom is here today for f/u T2DM, HTN\par \par Specialists:\par -Optho: He will provide me with this info at upcoming visit\par \par -F/u labs drawn in office today\par -Further recs pending lab results\par -Still needs to f/u w/ Uro (ED non-responsive to Sildenaphil)\par -Still needs to f/u w/ GI (Colonoscopy)\par -Still needs to f/u w/ Optho (Dilated diabetic eye exam)\par -RTO 3mo for CPE or sooner if needed\par \par Pt non-compliant to f/u. Will only provide 90 day supply of meds. After that only 15 day refills

## 2022-03-17 ENCOUNTER — TRANSCRIPTION ENCOUNTER (OUTPATIENT)
Age: 57
End: 2022-03-17

## 2022-03-17 RX ORDER — EMPAGLIFLOZIN 25 MG/1
25 TABLET, FILM COATED ORAL
Qty: 90 | Refills: 0 | Status: DISCONTINUED | COMMUNITY
Start: 2022-03-16 | End: 2022-03-17

## 2022-03-18 LAB
ALBUMIN SERPL ELPH-MCNC: 4.6 G/DL
ALP BLD-CCNC: 121 U/L
ALT SERPL-CCNC: 212 U/L
ANION GAP SERPL CALC-SCNC: 14 MMOL/L
AST SERPL-CCNC: 122 U/L
BILIRUB SERPL-MCNC: 0.5 MG/DL
BUN SERPL-MCNC: 10 MG/DL
CALCIUM SERPL-MCNC: 9.4 MG/DL
CHLORIDE SERPL-SCNC: 97 MMOL/L
CHOLEST SERPL-MCNC: 231 MG/DL
CO2 SERPL-SCNC: 25 MMOL/L
CREAT SERPL-MCNC: 0.71 MG/DL
EGFR: 109 ML/MIN/1.73M2
ESTIMATED AVERAGE GLUCOSE: 263 MG/DL
GLUCOSE SERPL-MCNC: 354 MG/DL
HBA1C MFR BLD HPLC: 10.8 %
HDLC SERPL-MCNC: 27 MG/DL
LDLC SERPL CALC-MCNC: NORMAL MG/DL
NONHDLC SERPL-MCNC: 204 MG/DL
POTASSIUM SERPL-SCNC: 3.9 MMOL/L
PROT SERPL-MCNC: 7.1 G/DL
SODIUM SERPL-SCNC: 136 MMOL/L
TRIGL SERPL-MCNC: 649 MG/DL

## 2022-03-21 ENCOUNTER — NON-APPOINTMENT (OUTPATIENT)
Age: 57
End: 2022-03-21

## 2022-03-23 ENCOUNTER — NON-APPOINTMENT (OUTPATIENT)
Age: 57
End: 2022-03-23

## 2022-03-26 ENCOUNTER — TRANSCRIPTION ENCOUNTER (OUTPATIENT)
Age: 57
End: 2022-03-26

## 2022-04-04 ENCOUNTER — APPOINTMENT (OUTPATIENT)
Dept: INTERNAL MEDICINE | Facility: CLINIC | Age: 57
End: 2022-04-04
Payer: COMMERCIAL

## 2022-04-04 VITALS
SYSTOLIC BLOOD PRESSURE: 154 MMHG | TEMPERATURE: 97.2 F | WEIGHT: 221 LBS | HEART RATE: 100 BPM | OXYGEN SATURATION: 98 % | BODY MASS INDEX: 32.73 KG/M2 | DIASTOLIC BLOOD PRESSURE: 98 MMHG | HEIGHT: 69 IN | RESPIRATION RATE: 16 BRPM

## 2022-04-04 PROCEDURE — 99214 OFFICE O/P EST MOD 30 MIN: CPT

## 2022-04-04 NOTE — PHYSICAL EXAM
[Normal] : no rash [de-identified] : Right anterior shin with an oval shapped erythematous macule

## 2022-04-04 NOTE — HISTORY OF PRESENT ILLNESS
[FreeTextEntry8] : -PMH: T2DM, HTN, HLD, Transaminitis 2/2 Hepatic Steatosis\par -SH: . 2 children. Account . >4 EtOH Beverages/day. Non-Smoker. \par \par YOANA is a 56 year M whom is here today w/ c/o skin rash On his right lower extremity that began this past Tuesday while he was away. Since onset, rash slightly increased in size but has not spread elsewhere. He reports associated itching. Has been applying topical clobetasol for the past 2 days with not much improvement. Denies any other skin rashes, fever, chills.

## 2022-04-04 NOTE — ASSESSMENT
[FreeTextEntry1] : Looks like local allergy possibly to an insect bite. No signs of cellulitis.\par Continue with topical clobetasol\par Continue with p.r.n. Benadryl\par Start OTC Claritin\par Failure to resolve or worsening symptoms patient will notify office immediately. Consider oral steroid based on progression of symptoms

## 2022-04-11 PROBLEM — Z11.59 SCREENING FOR VIRAL DISEASE: Status: RESOLVED | Noted: 2020-07-08 | Resolved: 2021-01-19

## 2022-05-11 ENCOUNTER — NON-APPOINTMENT (OUTPATIENT)
Age: 57
End: 2022-05-11

## 2022-05-11 ENCOUNTER — APPOINTMENT (OUTPATIENT)
Dept: INTERNAL MEDICINE | Facility: CLINIC | Age: 57
End: 2022-05-11
Payer: COMMERCIAL

## 2022-05-11 VITALS
HEIGHT: 69 IN | OXYGEN SATURATION: 97 % | WEIGHT: 220 LBS | DIASTOLIC BLOOD PRESSURE: 88 MMHG | BODY MASS INDEX: 32.58 KG/M2 | RESPIRATION RATE: 16 BRPM | SYSTOLIC BLOOD PRESSURE: 138 MMHG | HEART RATE: 86 BPM | TEMPERATURE: 97.2 F

## 2022-05-11 DIAGNOSIS — Z23 ENCOUNTER FOR IMMUNIZATION: ICD-10-CM

## 2022-05-11 DIAGNOSIS — Z13.6 ENCOUNTER FOR SCREENING FOR CARDIOVASCULAR DISORDERS: ICD-10-CM

## 2022-05-11 DIAGNOSIS — N52.9 MALE ERECTILE DYSFUNCTION, UNSPECIFIED: ICD-10-CM

## 2022-05-11 DIAGNOSIS — K21.9 GASTRO-ESOPHAGEAL REFLUX DISEASE W/OUT ESOPHAGITIS: ICD-10-CM

## 2022-05-11 PROCEDURE — 93000 ELECTROCARDIOGRAM COMPLETE: CPT

## 2022-05-11 PROCEDURE — 99396 PREV VISIT EST AGE 40-64: CPT | Mod: 25

## 2022-05-11 PROCEDURE — 36415 COLL VENOUS BLD VENIPUNCTURE: CPT

## 2022-05-11 NOTE — HEALTH RISK ASSESSMENT
[Very Good] : ~his/her~  mood as very good [Yes] : Yes [2 - 3 times a week (3 pts)] : 2 - 3  times a week (3 points) [5 or 6 (2 pts)] : 5 or 6 (2  points) [Weekly (3 pts)] : Weekly (3 points) [No] : In the past 12 months have you used drugs other than those required for medical reasons? No [0] : 2) Feeling down, depressed, or hopeless: Not at all (0) [Patient declined Retinal Exam] : Patient declined Retinal Exam. [Patient declined colonoscopy] : Patient declined colonoscopy [HIV test declined] : HIV test declined [Hepatitis C test declined] : Hepatitis C test declined [With Family] : lives with family [Employed] : employed [] :  [# Of Children ___] : has [unfilled] children [Reviewed no changes] : Reviewed, no changes [Never] : Never [PHQ-2 Negative - No further assessment needed] : PHQ-2 Negative - No further assessment needed [Audit-CScore] : 8 [[id number]] : 0 [EyeExamDate] : 05/22 [Change in mental status noted] : No change in mental status noted [Reports changes in hearing] : Reports no changes in hearing [Reports changes in vision] : Reports no changes in vision [ColonoscopyDate] : 05/22 [AdvancecareDate] : 05/22

## 2022-05-11 NOTE — HISTORY OF PRESENT ILLNESS
[FreeTextEntry1] : ANnual well visit [de-identified] : -PMH: T2DM, HTN, HLD, Transaminitis 2/2 Hepatic Steatosis\par -SH: . 2 children. Account . >4 EtOH Beverages/day. Non-Smoker. \par \par YOANA is a 56 year M whom is here today for CPE\par Today, pt reports feeling well and is w/o complaints\par He denies any changes since our last f/u visit\par \par Specialists Involved:\par -Optho: Dr. Moran ([phone])\par \par -Vaccines: Needs PPSV 23, Shingles, TDap (Declines at this time)\par -Colonoscopy: Still needs to f/u w/ GI\par -FH of Prostate, Colon, breast or ovarian CA: None known\par \par -T2DM: Remains on Tradjenta 5mg QD & Metformin 1000mg BID. On ASA & Statin. Compliant w/ BG Monitoring. (3/2022) A1c 10.8. Non-compliant w/ Optho f/u. No reported changes. \par \par -HTN: Now on Amlodipine 5mg QD. Remains on HCTZ 25mg & Losartan 100mg. Does not follow w/ cardio. No reported changes. \par -HLD: Back on  Atorvastatin 40mg QHS. No reported changes. \par \par -Hepatic Steatosis: (2018) CT Abd demonstrated diffuse steatosis. He admits to binge episodes of EtOH. Not interested in cutting back. Labs from march demonstrated significant increased in LFTs, pt was therefore referred to GI

## 2022-05-11 NOTE — ASSESSMENT
[FreeTextEntry1] : -PMH: T2DM, HTN, HLD, Transaminitis 2/2 Hepatic Steatosis\par -SH: . 2 children. Account . >4 EtOH Beverages/day. Non-Smoker. \par \par YOANA is a 56 year M whom is here today for CPE\par \par Specialists:\par None\par \par EKG obtained in office today demonstrates NSR. normal axis/Intervals. Good-R-wave progression. Normal EKG. \par \par iNCREASING RASH WHICH LOOKS LIKE SCRATCH WORSENS WILLL SEND IN MEDROL PACK FOR CNTACT DERMATITIS\par \par -F/u labs drawn in office today\par -Further recs pending lab results\par -F/u records from optho\par -Still needs to f/u w/ Uro (ED non-responsive to Sildenafil)\par -Still needs to f/u w/ GI (Colonoscopy)\par -Continue annual f/u w/ Optho (Dilated diabetic eye exam)\par -RTO 3mo for routine f/u or sooner if needed\par \par Pt non-compliant to f/u. Will only provide 90 day supply of meds. After that only 15 day refills.

## 2022-05-12 ENCOUNTER — NON-APPOINTMENT (OUTPATIENT)
Age: 57
End: 2022-05-12

## 2022-05-12 LAB
ALBUMIN SERPL ELPH-MCNC: 4.7 G/DL
ALP BLD-CCNC: 110 U/L
ALT SERPL-CCNC: 132 U/L
ANION GAP SERPL CALC-SCNC: 13 MMOL/L
AST SERPL-CCNC: 95 U/L
BILIRUB SERPL-MCNC: 0.7 MG/DL
BUN SERPL-MCNC: 13 MG/DL
CALCIUM SERPL-MCNC: 9.7 MG/DL
CHLORIDE SERPL-SCNC: 98 MMOL/L
CHOLEST SERPL-MCNC: 168 MG/DL
CO2 SERPL-SCNC: 26 MMOL/L
CREAT SERPL-MCNC: 0.76 MG/DL
EGFR: 107 ML/MIN/1.73M2
ESTIMATED AVERAGE GLUCOSE: 260 MG/DL
GLUCOSE SERPL-MCNC: 363 MG/DL
HBA1C MFR BLD HPLC: 10.7 %
HDLC SERPL-MCNC: 39 MG/DL
LDLC SERPL CALC-MCNC: 75 MG/DL
NONHDLC SERPL-MCNC: 128 MG/DL
POTASSIUM SERPL-SCNC: 4 MMOL/L
PROT SERPL-MCNC: 7.1 G/DL
PSA SERPL-MCNC: 2.36 NG/ML
SODIUM SERPL-SCNC: 137 MMOL/L
TRIGL SERPL-MCNC: 268 MG/DL

## 2022-05-16 RX ORDER — METFORMIN HYDROCHLORIDE 1000 MG/1
1000 TABLET, FILM COATED, EXTENDED RELEASE ORAL
Qty: 180 | Refills: 0 | Status: DISCONTINUED | COMMUNITY
Start: 2022-05-11 | End: 2022-05-16

## 2022-05-17 ENCOUNTER — NON-APPOINTMENT (OUTPATIENT)
Age: 57
End: 2022-05-17

## 2022-05-18 ENCOUNTER — TRANSCRIPTION ENCOUNTER (OUTPATIENT)
Age: 57
End: 2022-05-18

## 2022-06-20 ENCOUNTER — TRANSCRIPTION ENCOUNTER (OUTPATIENT)
Age: 57
End: 2022-06-20

## 2022-07-19 ENCOUNTER — TRANSCRIPTION ENCOUNTER (OUTPATIENT)
Age: 57
End: 2022-07-19

## 2022-07-20 ENCOUNTER — TRANSCRIPTION ENCOUNTER (OUTPATIENT)
Age: 57
End: 2022-07-20

## 2022-07-22 ENCOUNTER — TRANSCRIPTION ENCOUNTER (OUTPATIENT)
Age: 57
End: 2022-07-22

## 2022-07-28 ENCOUNTER — TRANSCRIPTION ENCOUNTER (OUTPATIENT)
Age: 57
End: 2022-07-28

## 2022-08-05 ENCOUNTER — APPOINTMENT (OUTPATIENT)
Dept: INTERNAL MEDICINE | Facility: CLINIC | Age: 57
End: 2022-08-05

## 2022-08-05 VITALS
BODY MASS INDEX: 32.58 KG/M2 | SYSTOLIC BLOOD PRESSURE: 126 MMHG | WEIGHT: 220 LBS | HEIGHT: 69 IN | DIASTOLIC BLOOD PRESSURE: 80 MMHG | TEMPERATURE: 97.2 F | HEART RATE: 97 BPM | OXYGEN SATURATION: 98 %

## 2022-08-05 PROCEDURE — 99214 OFFICE O/P EST MOD 30 MIN: CPT | Mod: 25

## 2022-08-05 PROCEDURE — 36415 COLL VENOUS BLD VENIPUNCTURE: CPT

## 2022-08-05 RX ORDER — METFORMIN HYDROCHLORIDE 1000 MG/1
1000 TABLET, COATED ORAL
Qty: 180 | Refills: 0 | Status: DISCONTINUED | COMMUNITY
Start: 2019-12-11 | End: 2022-08-05

## 2022-08-05 RX ORDER — BLOOD SUGAR DIAGNOSTIC
STRIP MISCELLANEOUS DAILY
Qty: 100 | Refills: 3 | Status: ACTIVE | COMMUNITY
Start: 2022-08-05 | End: 1900-01-01

## 2022-08-05 RX ORDER — PREDNISOLONE ACETATE 10 MG/ML
1 SUSPENSION/ DROPS OPHTHALMIC
Qty: 15 | Refills: 0 | Status: DISCONTINUED | COMMUNITY
Start: 2022-05-25

## 2022-08-08 LAB
ALBUMIN SERPL ELPH-MCNC: 4.8 G/DL
ALP BLD-CCNC: 100 U/L
ALT SERPL-CCNC: 120 U/L
ANION GAP SERPL CALC-SCNC: 14 MMOL/L
AST SERPL-CCNC: 103 U/L
BASOPHILS # BLD AUTO: 0.04 K/UL
BASOPHILS NFR BLD AUTO: 0.6 %
BILIRUB SERPL-MCNC: 0.8 MG/DL
BUN SERPL-MCNC: 14 MG/DL
CALCIUM SERPL-MCNC: 10.5 MG/DL
CHLORIDE SERPL-SCNC: 97 MMOL/L
CHOLEST SERPL-MCNC: 131 MG/DL
CO2 SERPL-SCNC: 28 MMOL/L
CREAT SERPL-MCNC: 0.83 MG/DL
CREAT SPEC-SCNC: 104 MG/DL
EGFR: 104 ML/MIN/1.73M2
EOSINOPHIL # BLD AUTO: 0.11 K/UL
EOSINOPHIL NFR BLD AUTO: 1.8 %
ESTIMATED AVERAGE GLUCOSE: 237 MG/DL
GLUCOSE SERPL-MCNC: 276 MG/DL
HBA1C MFR BLD HPLC: 9.9 %
HCT VFR BLD CALC: 49.5 %
HDLC SERPL-MCNC: 37 MG/DL
HGB BLD-MCNC: 15.9 G/DL
IMM GRANULOCYTES NFR BLD AUTO: 0.2 %
LDLC SERPL CALC-MCNC: 66 MG/DL
LYMPHOCYTES # BLD AUTO: 2.19 K/UL
LYMPHOCYTES NFR BLD AUTO: 34.9 %
MAN DIFF?: NORMAL
MCHC RBC-ENTMCNC: 30.1 PG
MCHC RBC-ENTMCNC: 32.1 GM/DL
MCV RBC AUTO: 93.8 FL
MICROALBUMIN 24H UR DL<=1MG/L-MCNC: <1.2 MG/DL
MICROALBUMIN/CREAT 24H UR-RTO: NORMAL MG/G
MONOCYTES # BLD AUTO: 0.81 K/UL
MONOCYTES NFR BLD AUTO: 12.9 %
NEUTROPHILS # BLD AUTO: 3.12 K/UL
NEUTROPHILS NFR BLD AUTO: 49.6 %
NONHDLC SERPL-MCNC: 93 MG/DL
PLATELET # BLD AUTO: 205 K/UL
POTASSIUM SERPL-SCNC: 4 MMOL/L
PROT SERPL-MCNC: 7.5 G/DL
RBC # BLD: 5.28 M/UL
RBC # FLD: 12.7 %
SODIUM SERPL-SCNC: 138 MMOL/L
TRIGL SERPL-MCNC: 138 MG/DL
VIT B12 SERPL-MCNC: 509 PG/ML
WBC # FLD AUTO: 6.28 K/UL

## 2022-08-08 NOTE — HISTORY OF PRESENT ILLNESS
[FreeTextEntry1] :  Uncontrolled DM, HTN, Hepatic steatosis [de-identified] : -PMH: T2DM, HTN, HLD, Transaminitis 2/2 Hepatic Steatosis\par -SH: . 2 children. Account . >4 EtOH Beverages/day. Non-Smoker. \par \par YOANA is a 56 year M whom is here today for f/u Uncontrolled DM, HTN, Hepatic steatosis\par Today, pt reports feeling well \par \par -T2DM: Remains on Tradjenta 5mg QD & Metformin 1000mg BID (Reports improved compliance). On ASA & Statin. Non-Compliant w/ BG Monitoring. (5/2022) A1c 10.7. (5/2022) Optho Eval: No Retinopathy. No reported changes. \par \par -HTN: Remains on Amlodipine 5mg QD, HCTZ 25mg & Losartan 100mg. Does not follow w/ cardio. No reported changes. \par -HLD: Remains on Atorvastatin 40mg QHS. No reported changes. \par \par -Hepatic Steatosis: (2018) CT Abd demonstrated diffuse steatosis. He admits to binge episodes of EtOH. Not interested in cutting back. Labs from march demonstrated significant increased in LFTs, pt was therefore referred to GI

## 2022-08-08 NOTE — ASSESSMENT
[FreeTextEntry1] : -PMH: T2DM, HTN, HLD, Transaminitis 2/2 Hepatic Steatosis\par -SH: . 2 children. Account . >4 EtOH Beverages/day. Non-Smoker. \par \par YOANA is a 56 year M whom is here today for f/u Uncontrolled DM, HTN, Hepatic steatosis\par \par Specialists:\par -Optho: [facility] ([phone])\par \par -F/u labs drawn in office today\par -Further recs pending lab results\par -Still needs to f/u w/ Uro (ED non-responsive to Sildenafil)\par -Still needs to f/u w/ GI (Colonoscopy & Worsening LFTs in setting of Known Fatty Liver Dz)\par -Continue annual f/u w/ Optho (Dilated diabetic eye exam)\par -RTO 2mo for routine f/u or sooner if needed

## 2022-08-08 NOTE — ADDENDUM
[FreeTextEntry1] : Cholesterol Improved and now controlled\par \par A1c 9.9\par Improve med compliance\par Start Jardiance 25mg QD\par Endo consult\par \par Persistently elevated LFTs\par GI Consultation again strongly recommended

## 2022-08-26 ENCOUNTER — NON-APPOINTMENT (OUTPATIENT)
Age: 57
End: 2022-08-26

## 2022-09-16 ENCOUNTER — RX RENEWAL (OUTPATIENT)
Age: 57
End: 2022-09-16

## 2022-10-17 ENCOUNTER — TRANSCRIPTION ENCOUNTER (OUTPATIENT)
Age: 57
End: 2022-10-17

## 2022-12-19 ENCOUNTER — TRANSCRIPTION ENCOUNTER (OUTPATIENT)
Age: 57
End: 2022-12-19

## 2023-01-04 ENCOUNTER — RESULT CHARGE (OUTPATIENT)
Age: 58
End: 2023-01-04

## 2023-01-04 ENCOUNTER — APPOINTMENT (OUTPATIENT)
Dept: ENDOCRINOLOGY | Facility: CLINIC | Age: 58
End: 2023-01-04
Payer: COMMERCIAL

## 2023-01-04 VITALS — HEART RATE: 86 BPM | SYSTOLIC BLOOD PRESSURE: 140 MMHG | DIASTOLIC BLOOD PRESSURE: 82 MMHG

## 2023-01-04 VITALS — HEIGHT: 69 IN | BODY MASS INDEX: 32.73 KG/M2 | WEIGHT: 221 LBS

## 2023-01-04 DIAGNOSIS — E78.1 PURE HYPERGLYCERIDEMIA: ICD-10-CM

## 2023-01-04 LAB — GLUCOSE BLDC GLUCOMTR-MCNC: 257

## 2023-01-04 PROCEDURE — 99205 OFFICE O/P NEW HI 60 MIN: CPT

## 2023-01-05 RX ORDER — METFORMIN HYDROCHLORIDE 1000 MG/1
1000 TABLET, FILM COATED, EXTENDED RELEASE ORAL
Qty: 180 | Refills: 1 | Status: DISCONTINUED | COMMUNITY
Start: 2022-07-20 | End: 2023-01-05

## 2023-01-10 LAB
ANION GAP SERPL CALC-SCNC: 15 MMOL/L
BUN SERPL-MCNC: 17 MG/DL
CALCIUM SERPL-MCNC: 9.6 MG/DL
CHLORIDE SERPL-SCNC: 102 MMOL/L
CHOLEST SERPL-MCNC: 140 MG/DL
CO2 SERPL-SCNC: 24 MMOL/L
CREAT SERPL-MCNC: 0.92 MG/DL
CREAT SPEC-SCNC: 133 MG/DL
EGFR: 98 ML/MIN/1.73M2
ESTIMATED AVERAGE GLUCOSE: 148 MG/DL
GLUCOSE SERPL-MCNC: 112 MG/DL
HBA1C MFR BLD HPLC: 6.8 %
HDLC SERPL-MCNC: 37 MG/DL
LDLC SERPL CALC-MCNC: 73 MG/DL
MICROALBUMIN 24H UR DL<=1MG/L-MCNC: <1.2 MG/DL
MICROALBUMIN/CREAT 24H UR-RTO: NORMAL MG/G
NONHDLC SERPL-MCNC: 103 MG/DL
POTASSIUM SERPL-SCNC: 3.7 MMOL/L
SODIUM SERPL-SCNC: 142 MMOL/L
TRIGL SERPL-MCNC: 150 MG/DL

## 2023-01-13 ENCOUNTER — NON-APPOINTMENT (OUTPATIENT)
Age: 58
End: 2023-01-13

## 2023-03-27 ENCOUNTER — RX RENEWAL (OUTPATIENT)
Age: 58
End: 2023-03-27

## 2023-04-03 ENCOUNTER — TRANSCRIPTION ENCOUNTER (OUTPATIENT)
Age: 58
End: 2023-04-03

## 2023-04-13 ENCOUNTER — APPOINTMENT (OUTPATIENT)
Dept: INTERNAL MEDICINE | Facility: CLINIC | Age: 58
End: 2023-04-13
Payer: COMMERCIAL

## 2023-04-13 VITALS
RESPIRATION RATE: 16 BRPM | HEART RATE: 71 BPM | OXYGEN SATURATION: 98 % | DIASTOLIC BLOOD PRESSURE: 88 MMHG | SYSTOLIC BLOOD PRESSURE: 130 MMHG | BODY MASS INDEX: 33.18 KG/M2 | HEIGHT: 69 IN | TEMPERATURE: 97.3 F | WEIGHT: 224 LBS

## 2023-04-13 PROCEDURE — 99214 OFFICE O/P EST MOD 30 MIN: CPT | Mod: 25

## 2023-04-13 PROCEDURE — 36415 COLL VENOUS BLD VENIPUNCTURE: CPT

## 2023-04-13 RX ORDER — LINAGLIPTIN 5 MG/1
5 TABLET, FILM COATED ORAL DAILY
Qty: 90 | Refills: 1 | Status: DISCONTINUED | COMMUNITY
Start: 2022-03-17 | End: 2023-04-13

## 2023-04-13 NOTE — HISTORY OF PRESENT ILLNESS
[FreeTextEntry1] :  Uncontrolled DM, HTN, Hepatic steatosis [de-identified] : -PMH: T2DM, HTN, HLD, Transaminitis 2/2 Hepatic Steatosis\par -SH: . 2 children. Account . >4 EtOH Beverages/day. Non-Smoker. \par \par YOANA is a 57 year M whom is here today for f/u Uncontrolled DM, HTN, Hepatic steatosis\par Today, pt reports feeling well \par \par -T2DM: Remains on Glipizide 5mg BID, Jardiance 25mg QD & Metformin 1000mg BID. On ASA & Statin. Non-Compliant w/ BG Monitoring. (1/2023) A1c 6.8. (5/2022) Optho Eval: No Retinopathy. No reported changes. \par \par -HTN: Remains on Amlodipine 5mg QD, HCTZ 25mg & Losartan 100mg. Does not follow w/ cardio. No reported changes. \par -HLD: Remains on Atorvastatin 40mg QHS. No reported changes. \par \par -Hepatic Steatosis: (2018) CT Abd demonstrated diffuse steatosis. He admits to binge episodes of EtOH. Not interested in cutting back. Labs from march demonstrated significant increased in LFTs, pt was therefore referred to GI

## 2023-04-13 NOTE — ASSESSMENT
[FreeTextEntry1] : -PMH: T2DM, HTN, HLD, Transaminitis 2/2 Hepatic Steatosis\par -SH: . 2 children. Account . >4 EtOH Beverages/day. Non-Smoker. \par \par YOANA is a 57 year M whom is here today for f/u Uncontrolled DM, HTN, Hepatic steatosis\par \par Specialists:\par -Optho: [facility] ([phone])\par \par -F/u labs drawn in office today\par -Further recs pending lab results\par -Still needs to f/u w/ Uro (ED non-responsive to Sildenafil)\par -Still needs to f/u w/ GI (Colonoscopy & Worsening LFTs in setting of Known Fatty Liver Dz)\par -Continue annual f/u w/ Optho (Dilated diabetic eye exam)\par -RTO 6mo for routine f/u or sooner if needed

## 2023-04-17 LAB
ALBUMIN SERPL ELPH-MCNC: 4.7 G/DL
ALP BLD-CCNC: 106 U/L
ALT SERPL-CCNC: 61 U/L
ANION GAP SERPL CALC-SCNC: 15 MMOL/L
AST SERPL-CCNC: 46 U/L
BILIRUB SERPL-MCNC: 0.6 MG/DL
BUN SERPL-MCNC: 16 MG/DL
CALCIUM SERPL-MCNC: 10 MG/DL
CHLORIDE SERPL-SCNC: 100 MMOL/L
CO2 SERPL-SCNC: 25 MMOL/L
CREAT SERPL-MCNC: 0.85 MG/DL
EGFR: 103 ML/MIN/1.73M2
ESTIMATED AVERAGE GLUCOSE: 128 MG/DL
GLUCOSE SERPL-MCNC: 140 MG/DL
HBA1C MFR BLD HPLC: 6.1 %
POTASSIUM SERPL-SCNC: 3.9 MMOL/L
PROT SERPL-MCNC: 7.4 G/DL
SODIUM SERPL-SCNC: 139 MMOL/L

## 2023-04-19 ENCOUNTER — NON-APPOINTMENT (OUTPATIENT)
Age: 58
End: 2023-04-19

## 2023-05-24 ENCOUNTER — APPOINTMENT (OUTPATIENT)
Dept: UROLOGY | Facility: CLINIC | Age: 58
End: 2023-05-24

## 2023-09-14 ENCOUNTER — APPOINTMENT (OUTPATIENT)
Dept: ENDOCRINOLOGY | Facility: CLINIC | Age: 58
End: 2023-09-14

## 2023-10-12 ENCOUNTER — RX RENEWAL (OUTPATIENT)
Age: 58
End: 2023-10-12

## 2023-10-16 ENCOUNTER — RX RENEWAL (OUTPATIENT)
Age: 58
End: 2023-10-16

## 2023-11-08 ENCOUNTER — RX RENEWAL (OUTPATIENT)
Age: 58
End: 2023-11-08

## 2023-11-20 ENCOUNTER — TRANSCRIPTION ENCOUNTER (OUTPATIENT)
Age: 58
End: 2023-11-20

## 2023-12-01 ENCOUNTER — RX RENEWAL (OUTPATIENT)
Age: 58
End: 2023-12-01

## 2023-12-04 ENCOUNTER — RX RENEWAL (OUTPATIENT)
Age: 58
End: 2023-12-04

## 2023-12-28 ENCOUNTER — APPOINTMENT (OUTPATIENT)
Dept: INTERNAL MEDICINE | Facility: CLINIC | Age: 58
End: 2023-12-28
Payer: COMMERCIAL

## 2023-12-28 VITALS — SYSTOLIC BLOOD PRESSURE: 125 MMHG | DIASTOLIC BLOOD PRESSURE: 85 MMHG

## 2023-12-28 VITALS
TEMPERATURE: 96.8 F | HEIGHT: 69 IN | WEIGHT: 220 LBS | HEART RATE: 76 BPM | DIASTOLIC BLOOD PRESSURE: 85 MMHG | SYSTOLIC BLOOD PRESSURE: 134 MMHG | OXYGEN SATURATION: 98 % | RESPIRATION RATE: 16 BRPM | BODY MASS INDEX: 32.58 KG/M2

## 2023-12-28 DIAGNOSIS — L98.9 DISORDER OF THE SKIN AND SUBCUTANEOUS TISSUE, UNSPECIFIED: ICD-10-CM

## 2023-12-28 DIAGNOSIS — Z12.5 ENCOUNTER FOR SCREENING FOR MALIGNANT NEOPLASM OF PROSTATE: ICD-10-CM

## 2023-12-28 DIAGNOSIS — R21 RASH AND OTHER NONSPECIFIC SKIN ERUPTION: ICD-10-CM

## 2023-12-28 LAB
CREAT SPEC-SCNC: 100 MG/DL
FERRITIN SERPL-MCNC: 115 NG/ML
HAV IGM SER QL: NONREACTIVE
HBV CORE IGM SER QL: NONREACTIVE
HBV SURFACE AG SER QL: NONREACTIVE
HCV AB SER QL: NONREACTIVE
HCV S/CO RATIO: 0.09 S/CO
MICROALBUMIN 24H UR DL<=1MG/L-MCNC: <1.2 MG/DL
MICROALBUMIN/CREAT 24H UR-RTO: NORMAL MG/G
PSA SERPL-MCNC: 2.62 NG/ML
TSH SERPL-ACNC: 2.56 UIU/ML
VIT B12 SERPL-MCNC: 675 PG/ML

## 2023-12-28 PROCEDURE — 99214 OFFICE O/P EST MOD 30 MIN: CPT | Mod: 25

## 2023-12-28 PROCEDURE — 36415 COLL VENOUS BLD VENIPUNCTURE: CPT

## 2023-12-28 RX ORDER — AMLODIPINE BESYLATE 5 MG/1
5 TABLET ORAL DAILY
Qty: 90 | Refills: 1 | Status: DISCONTINUED | COMMUNITY
Start: 2022-03-16 | End: 2023-12-28

## 2023-12-28 RX ORDER — TADALAFIL 10 MG/1
10 TABLET, FILM COATED ORAL
Refills: 0 | Status: ACTIVE | COMMUNITY
Start: 2023-12-28

## 2023-12-28 NOTE — ASSESSMENT
[FreeTextEntry1] : -F/u labs drawn in office today -Further recs pending lab results -RTO 4mo CPE or sooner if needed

## 2023-12-28 NOTE — HISTORY OF PRESENT ILLNESS
[FreeTextEntry1] :  Uncontrolled DM, HTN, Hepatic steatosis [de-identified] : -PMH: T2DM, HTN, HLD, Transaminitis 2/2 Hepatic Steatosis -SH: . 2 children. Account . >4 EtOH Beverages/day. Non-Smoker.   YOANA is a 58 year M whom is here today for f/u Uncontrolled DM, HTN, HLD, Hepatic steatosis Today, pt reports feeling well  Of note, patient is highly noncompliant and recommended time to follow-up.  He has been only provided 30-day medication supplies until seen and he is here today.  Last seen back in April.  -T2DM: Remains on Glipizide 5mg BID, Jardiance 25mg QD & Metformin 1000mg BID. On ASA & Statin. Non-Compliant w/ BG Monitoring. (4/2023) A1c 6.1. (5/2023) Optho Eval: No Retinopathy. No reported changes.   -HTN: Amlodipine 5mg QD Stopped about 1mo ago. Remains on HCTZ 25mg & Losartan 100mg. No reported changes.  -HLD: Remains on Atorvastatin 40mg QHS. No reported changes.   -Hepatic Steatosis: (2018) CT Abd demonstrated diffuse steatosis. He admits to binge episodes of EtOH. Not interested in cutting back. increased in LFTs, pt was therefore referred to GI still not consulted with

## 2023-12-29 LAB
ALBUMIN SERPL ELPH-MCNC: 4.7 G/DL
ALP BLD-CCNC: 94 U/L
ALT SERPL-CCNC: 111 U/L
ANION GAP SERPL CALC-SCNC: 16 MMOL/L
AST SERPL-CCNC: 74 U/L
BASOPHILS # BLD AUTO: 0.04 K/UL
BASOPHILS NFR BLD AUTO: 0.8 %
BILIRUB SERPL-MCNC: 0.8 MG/DL
BUN SERPL-MCNC: 11 MG/DL
CALCIUM SERPL-MCNC: 9.7 MG/DL
CHLORIDE SERPL-SCNC: 101 MMOL/L
CHOLEST SERPL-MCNC: 146 MG/DL
CO2 SERPL-SCNC: 25 MMOL/L
CREAT SERPL-MCNC: 0.83 MG/DL
EGFR: 103 ML/MIN/1.73M2
EOSINOPHIL # BLD AUTO: 0.16 K/UL
EOSINOPHIL NFR BLD AUTO: 3.2 %
ESTIMATED AVERAGE GLUCOSE: 163 MG/DL
GLUCOSE SERPL-MCNC: 145 MG/DL
HBA1C MFR BLD HPLC: 7.3 %
HCT VFR BLD CALC: 48.8 %
HDLC SERPL-MCNC: 35 MG/DL
HGB BLD-MCNC: 16.3 G/DL
IMM GRANULOCYTES NFR BLD AUTO: 0.2 %
LDLC SERPL CALC-MCNC: 76 MG/DL
LYMPHOCYTES # BLD AUTO: 2.39 K/UL
LYMPHOCYTES NFR BLD AUTO: 48.4 %
MAN DIFF?: NORMAL
MCHC RBC-ENTMCNC: 31.3 PG
MCHC RBC-ENTMCNC: 33.4 GM/DL
MCV RBC AUTO: 93.7 FL
MONOCYTES # BLD AUTO: 0.64 K/UL
MONOCYTES NFR BLD AUTO: 13 %
NEUTROPHILS # BLD AUTO: 1.7 K/UL
NEUTROPHILS NFR BLD AUTO: 34.4 %
NONHDLC SERPL-MCNC: 111 MG/DL
PLATELET # BLD AUTO: 184 K/UL
POTASSIUM SERPL-SCNC: 3.6 MMOL/L
PROT SERPL-MCNC: 7.4 G/DL
RBC # BLD: 5.21 M/UL
RBC # FLD: 12.8 %
SODIUM SERPL-SCNC: 142 MMOL/L
TRIGL SERPL-MCNC: 206 MG/DL
WBC # FLD AUTO: 4.94 K/UL

## 2024-01-03 ENCOUNTER — NON-APPOINTMENT (OUTPATIENT)
Age: 59
End: 2024-01-03

## 2024-01-07 ENCOUNTER — TRANSCRIPTION ENCOUNTER (OUTPATIENT)
Age: 59
End: 2024-01-07

## 2024-01-07 ENCOUNTER — RX RENEWAL (OUTPATIENT)
Age: 59
End: 2024-01-07

## 2024-01-18 ENCOUNTER — APPOINTMENT (OUTPATIENT)
Dept: ORTHOPEDIC SURGERY | Facility: CLINIC | Age: 59
End: 2024-01-18
Payer: COMMERCIAL

## 2024-01-18 VITALS — HEIGHT: 69 IN | BODY MASS INDEX: 32.58 KG/M2 | WEIGHT: 220 LBS

## 2024-01-18 DIAGNOSIS — E11.9 TYPE 2 DIABETES MELLITUS W/OUT COMPLICATIONS: ICD-10-CM

## 2024-01-18 DIAGNOSIS — M17.12 UNILATERAL PRIMARY OSTEOARTHRITIS, LEFT KNEE: ICD-10-CM

## 2024-01-18 PROCEDURE — 73564 X-RAY EXAM KNEE 4 OR MORE: CPT | Mod: LT

## 2024-01-18 PROCEDURE — 99205 OFFICE O/P NEW HI 60 MIN: CPT

## 2024-01-18 PROCEDURE — 99204 OFFICE O/P NEW MOD 45 MIN: CPT | Mod: 57

## 2024-01-18 NOTE — DISCUSSION/SUMMARY
[de-identified] : The natural progression of Osteoarthritis was explained to the patient.  We discussed the possible treatment options from conservative to operative.  These included NSAIDS, Glucosamine and Chondroitin sulfate, and Physical Therapy as well different types of injections.  We also discussed that at some point they may progress to needing a TKA.  Information and pamphlets were given when appropriate.   Patient Complains of pain in Knee with a level that often reaches greater than a 8/10. The Pain has been progressively worsening of his/her treatment course. The pain has interfered with their ADLs and worsens with weight bearing. On exam they often have episodes of swelling/effusion with limited ROM. Pain worsens with ROM passive and active and I can palpate crepitus.   X-rays were reviewed with the patient, and they show joint space narrowing, subchondral sclerosis, osteophyte formation, and subchondral cysts.   After a period of more than 12 weeks physical therapy or exercise program done with me or another treating physician, they have continued pain. The patient has failed a trial of NSAID medication or pain relievers if they were unable to tolerate NSAID medications as well as a series of injection, steroid or Hyaluronic Acid. After a long discussion with the patient both the patient and I have decided we have exhausted all forms of less radical treatments, and they would like to proceed with Total Knee Replacement   We discussed my findings and the natural history of their condition.  We talked about the details of the proposed surgery and the recovery.  We discussed the material risks, possible benefits and alternatives to surgery.  The risks include but are not limited to infection, bleeding and possible need for blood transfusion, fracture, bowel blockage, bladder retention or infection, need for reoperation, stiffness and/or limited range of motion, possible damage to nerves and blood vessels, failure of fixation of components, risk of deep vein thromboses and pulmonary embolism, wound healing problems, dislocation, and possible leg length discrepancy.  Although incredibly rare, we also discussed the risks of a cardiac event, stroke and even death during, or following, the surgery.  We discussed the type of implants the patient will be receiving and the type of fixation that will be used, as well as whether a robot or computer navigation aide will be used.  The patient understands they will need medical clearance and will attend a preoperative joint education class.  We also discussed the type of anesthesia they will receive, and the risks associated with hospital or rehab length of stay, obesity, diabetes and smoking.  Progress note completed by Tg Mascorro PA-C, acting as scribe.

## 2024-01-18 NOTE — HISTORY OF PRESENT ILLNESS
[Gradual] : gradual [0] : 0 [4] : 4 [Localized] : localized [Full time] : Work status: full time [de-identified] : 1/18/24: 59yo M with longstanding left knee pain that has been gradually worsening over the past few months with no injury. Admits to increasing pain and difficulty with prolonged walking/standing, startup, and stairs. Hx of L knee arthroscopy ~30yrs ago. He has tried CSI ~7+ years ago that provided mild relief. Notes he cannot straighten his leg.  [] : no [FreeTextEntry1] : l knee [FreeTextEntry5] : chronic knee pain, has had sx in the past. Pain getting worse and hurts more at night. Would like to discuss TKA. [FreeTextEntry9] : CBD ointment on occasion [de-identified] : worse at night with constant throbbing  [de-identified] : 3 diiferent drs in the past [de-identified] : in the past [de-identified] : in the past

## 2024-01-18 NOTE — PHYSICAL EXAM
[] : patient ambulates without assistive device [Left] : left knee [advanced tricompartmental OA with lateral compartment narrowing and valgus alignment] : advanced tricompartmental OA with lateral compartment narrowing and valgus alignment [TWNoteComboBox7] : flexion 110 degrees [de-identified] : extension 7 degrees

## 2024-01-18 NOTE — ASSESSMENT
[FreeTextEntry1] : 58M with adv L knee OA   Discussed anti-inflammatories, PT/HEP, CSI, visco injections, and TKA. Discussed TKA, r/b/a, and preop/postop periods in detail. He would like to proceed with L TKA, preop CT to eval bone loss, we will call to schedule

## 2024-01-26 ENCOUNTER — NON-APPOINTMENT (OUTPATIENT)
Age: 59
End: 2024-01-26

## 2024-01-29 ENCOUNTER — TRANSCRIPTION ENCOUNTER (OUTPATIENT)
Age: 59
End: 2024-01-29

## 2024-01-30 ENCOUNTER — RESULT REVIEW (OUTPATIENT)
Age: 59
End: 2024-01-30

## 2024-02-15 ENCOUNTER — TRANSCRIPTION ENCOUNTER (OUTPATIENT)
Age: 59
End: 2024-02-15

## 2024-02-23 ENCOUNTER — APPOINTMENT (OUTPATIENT)
Dept: INTERNAL MEDICINE | Facility: CLINIC | Age: 59
End: 2024-02-23
Payer: COMMERCIAL

## 2024-02-23 ENCOUNTER — NON-APPOINTMENT (OUTPATIENT)
Age: 59
End: 2024-02-23

## 2024-02-23 VITALS
DIASTOLIC BLOOD PRESSURE: 90 MMHG | HEART RATE: 82 BPM | OXYGEN SATURATION: 98 % | WEIGHT: 224 LBS | TEMPERATURE: 97.1 F | SYSTOLIC BLOOD PRESSURE: 135 MMHG | HEIGHT: 69 IN | BODY MASS INDEX: 33.18 KG/M2

## 2024-02-23 DIAGNOSIS — Z00.00 ENCOUNTER FOR GENERAL ADULT MEDICAL EXAMINATION W/OUT ABNORMAL FINDINGS: ICD-10-CM

## 2024-02-23 PROCEDURE — 99213 OFFICE O/P EST LOW 20 MIN: CPT | Mod: 25

## 2024-02-23 PROCEDURE — 99396 PREV VISIT EST AGE 40-64: CPT

## 2024-02-23 PROCEDURE — 93000 ELECTROCARDIOGRAM COMPLETE: CPT

## 2024-02-23 PROCEDURE — 36415 COLL VENOUS BLD VENIPUNCTURE: CPT

## 2024-02-23 RX ORDER — EMPAGLIFLOZIN 25 MG/1
25 TABLET, FILM COATED ORAL
Qty: 90 | Refills: 1 | Status: ACTIVE | COMMUNITY
Start: 2022-08-08 | End: 1900-01-01

## 2024-02-23 RX ORDER — ATORVASTATIN CALCIUM 40 MG/1
40 TABLET, FILM COATED ORAL
Qty: 90 | Refills: 3 | Status: ACTIVE | COMMUNITY
Start: 2020-01-06 | End: 1900-01-01

## 2024-02-23 RX ORDER — LOSARTAN POTASSIUM 100 MG/1
100 TABLET, FILM COATED ORAL
Qty: 90 | Refills: 1 | Status: ACTIVE | COMMUNITY
Start: 2020-10-05 | End: 1900-01-01

## 2024-02-23 RX ORDER — METFORMIN HYDROCHLORIDE 1000 MG/1
1000 TABLET, COATED ORAL
Qty: 180 | Refills: 1 | Status: ACTIVE | COMMUNITY
Start: 2023-01-05 | End: 1900-01-01

## 2024-02-23 RX ORDER — HYDROCHLOROTHIAZIDE 25 MG/1
25 TABLET ORAL
Qty: 90 | Refills: 1 | Status: ACTIVE | COMMUNITY
Start: 2019-12-11 | End: 1900-01-01

## 2024-02-23 RX ORDER — GLIPIZIDE 5 MG/1
5 TABLET, FILM COATED, EXTENDED RELEASE ORAL
Qty: 180 | Refills: 1 | Status: DISCONTINUED | COMMUNITY
Start: 2023-01-04 | End: 2024-02-23

## 2024-02-26 LAB
ALBUMIN SERPL ELPH-MCNC: 4.8 G/DL
ALP BLD-CCNC: 101 U/L
ALT SERPL-CCNC: 99 U/L
ANION GAP SERPL CALC-SCNC: 15 MMOL/L
AST SERPL-CCNC: 63 U/L
BILIRUB SERPL-MCNC: 0.8 MG/DL
BUN SERPL-MCNC: 13 MG/DL
CALCIUM SERPL-MCNC: 10.1 MG/DL
CHLORIDE SERPL-SCNC: 102 MMOL/L
CO2 SERPL-SCNC: 24 MMOL/L
CREAT SERPL-MCNC: 0.77 MG/DL
EGFR: 105 ML/MIN/1.73M2
ESTIMATED AVERAGE GLUCOSE: 174 MG/DL
GLUCOSE SERPL-MCNC: 157 MG/DL
HBA1C MFR BLD HPLC: 7.7 %
POTASSIUM SERPL-SCNC: 3.8 MMOL/L
PROT SERPL-MCNC: 7.7 G/DL
SODIUM SERPL-SCNC: 142 MMOL/L

## 2024-03-04 ENCOUNTER — NON-APPOINTMENT (OUTPATIENT)
Age: 59
End: 2024-03-04

## 2024-03-05 ENCOUNTER — OUTPATIENT (OUTPATIENT)
Dept: OUTPATIENT SERVICES | Facility: HOSPITAL | Age: 59
LOS: 1 days | Discharge: HOME | End: 2024-03-05

## 2024-03-05 VITALS
DIASTOLIC BLOOD PRESSURE: 80 MMHG | SYSTOLIC BLOOD PRESSURE: 135 MMHG | HEART RATE: 88 BPM | WEIGHT: 225.97 LBS | RESPIRATION RATE: 17 BRPM | OXYGEN SATURATION: 98 % | TEMPERATURE: 98 F | HEIGHT: 69 IN

## 2024-03-05 DIAGNOSIS — M17.12 UNILATERAL PRIMARY OSTEOARTHRITIS, LEFT KNEE: ICD-10-CM

## 2024-03-05 DIAGNOSIS — E78.5 HYPERLIPIDEMIA, UNSPECIFIED: ICD-10-CM

## 2024-03-05 DIAGNOSIS — E11.9 TYPE 2 DIABETES MELLITUS WITHOUT COMPLICATIONS: ICD-10-CM

## 2024-03-05 DIAGNOSIS — I10 ESSENTIAL (PRIMARY) HYPERTENSION: ICD-10-CM

## 2024-03-05 DIAGNOSIS — Z98.890 OTHER SPECIFIED POSTPROCEDURAL STATES: Chronic | ICD-10-CM

## 2024-03-05 DIAGNOSIS — Z01.818 ENCOUNTER FOR OTHER PREPROCEDURAL EXAMINATION: ICD-10-CM

## 2024-03-05 LAB
A1C WITH ESTIMATED AVERAGE GLUCOSE RESULT: 7.9 % — HIGH (ref 4–5.6)
ALBUMIN SERPL ELPH-MCNC: 4.2 G/DL — SIGNIFICANT CHANGE UP (ref 3.3–5)
ALP SERPL-CCNC: 94 U/L — SIGNIFICANT CHANGE UP (ref 40–120)
ALT FLD-CCNC: 149 U/L — HIGH (ref 12–78)
ANION GAP SERPL CALC-SCNC: 7 MMOL/L — SIGNIFICANT CHANGE UP (ref 5–17)
APTT BLD: 30.9 SEC — SIGNIFICANT CHANGE UP (ref 24.5–35.6)
AST SERPL-CCNC: 102 U/L — HIGH (ref 15–37)
BASOPHILS # BLD AUTO: 0.05 K/UL — SIGNIFICANT CHANGE UP (ref 0–0.2)
BASOPHILS NFR BLD AUTO: 0.9 % — SIGNIFICANT CHANGE UP (ref 0–2)
BILIRUB SERPL-MCNC: 0.9 MG/DL — SIGNIFICANT CHANGE UP (ref 0.2–1.2)
BLD GP AB SCN SERPL QL: SIGNIFICANT CHANGE UP
BUN SERPL-MCNC: 16 MG/DL — SIGNIFICANT CHANGE UP (ref 7–23)
CALCIUM SERPL-MCNC: 9.9 MG/DL — SIGNIFICANT CHANGE UP (ref 8.5–10.1)
CHLORIDE SERPL-SCNC: 106 MMOL/L — SIGNIFICANT CHANGE UP (ref 96–108)
CO2 SERPL-SCNC: 27 MMOL/L — SIGNIFICANT CHANGE UP (ref 22–31)
CREAT SERPL-MCNC: 0.88 MG/DL — SIGNIFICANT CHANGE UP (ref 0.5–1.3)
EGFR: 101 ML/MIN/1.73M2 — SIGNIFICANT CHANGE UP
EOSINOPHIL # BLD AUTO: 0.25 K/UL — SIGNIFICANT CHANGE UP (ref 0–0.5)
EOSINOPHIL NFR BLD AUTO: 4.5 % — SIGNIFICANT CHANGE UP (ref 0–6)
ESTIMATED AVERAGE GLUCOSE: 180 MG/DL — HIGH (ref 68–114)
GLUCOSE SERPL-MCNC: 197 MG/DL — HIGH (ref 70–99)
HCT VFR BLD CALC: 47.6 % — SIGNIFICANT CHANGE UP (ref 39–50)
HGB BLD-MCNC: 16.8 G/DL — SIGNIFICANT CHANGE UP (ref 13–17)
IMM GRANULOCYTES NFR BLD AUTO: 0.2 % — SIGNIFICANT CHANGE UP (ref 0–0.9)
INR BLD: 0.88 RATIO — SIGNIFICANT CHANGE UP (ref 0.85–1.18)
LYMPHOCYTES # BLD AUTO: 2.05 K/UL — SIGNIFICANT CHANGE UP (ref 1–3.3)
LYMPHOCYTES # BLD AUTO: 37.3 % — SIGNIFICANT CHANGE UP (ref 13–44)
MCHC RBC-ENTMCNC: 31.3 PG — SIGNIFICANT CHANGE UP (ref 27–34)
MCHC RBC-ENTMCNC: 35.3 G/DL — SIGNIFICANT CHANGE UP (ref 32–36)
MCV RBC AUTO: 88.8 FL — SIGNIFICANT CHANGE UP (ref 80–100)
MONOCYTES # BLD AUTO: 0.58 K/UL — SIGNIFICANT CHANGE UP (ref 0–0.9)
MONOCYTES NFR BLD AUTO: 10.5 % — SIGNIFICANT CHANGE UP (ref 2–14)
MRSA PCR RESULT.: SIGNIFICANT CHANGE UP
NEUTROPHILS # BLD AUTO: 2.56 K/UL — SIGNIFICANT CHANGE UP (ref 1.8–7.4)
NEUTROPHILS NFR BLD AUTO: 46.6 % — SIGNIFICANT CHANGE UP (ref 43–77)
NRBC # BLD: 0 /100 WBCS — SIGNIFICANT CHANGE UP (ref 0–0)
PLATELET # BLD AUTO: 168 K/UL — SIGNIFICANT CHANGE UP (ref 150–400)
POTASSIUM SERPL-MCNC: 3.5 MMOL/L — SIGNIFICANT CHANGE UP (ref 3.5–5.3)
POTASSIUM SERPL-SCNC: 3.5 MMOL/L — SIGNIFICANT CHANGE UP (ref 3.5–5.3)
PROT SERPL-MCNC: 8.1 GM/DL — SIGNIFICANT CHANGE UP (ref 6–8.3)
PROTHROM AB SERPL-ACNC: 10.6 SEC — SIGNIFICANT CHANGE UP (ref 9.5–13)
RBC # BLD: 5.36 M/UL — SIGNIFICANT CHANGE UP (ref 4.2–5.8)
RBC # FLD: 12.6 % — SIGNIFICANT CHANGE UP (ref 10.3–14.5)
S AUREUS DNA NOSE QL NAA+PROBE: DETECTED
SODIUM SERPL-SCNC: 140 MMOL/L — SIGNIFICANT CHANGE UP (ref 135–145)
VIT D25+D1,25 OH+D1,25 PNL SERPL-MCNC: 34.2 PG/ML — SIGNIFICANT CHANGE UP (ref 19.9–79.3)
WBC # BLD: 5.5 K/UL — SIGNIFICANT CHANGE UP (ref 3.8–10.5)
WBC # FLD AUTO: 5.5 K/UL — SIGNIFICANT CHANGE UP (ref 3.8–10.5)

## 2024-03-05 NOTE — OCCUPATIONAL THERAPY INITIAL EVALUATION ADULT - PERTINENT HX OF CURRENT PROBLEM, REHAB EVAL
Pt is a 59 y/o male slated for elective surgery for left TKR with MD López on 3/18/24 , due to OA, chronic pain and DJD. Pt reported buckling in his left denied any  falls in the past 3-6 months Pt is a 59 y/o male slated for elective surgery for left TKR with MD López on 3/18/24 , due to OA, chronic pain and DJD. Pt reported buckling in his left denied any  falls in the past 3-6 months.

## 2024-03-05 NOTE — H&P PST ADULT - HISTORY OF PRESENT ILLNESS
57 y/o male with DM, HTN, HLD, with pshx of left knee arthroscopy. Today he presents with left knee pain 2/2 OA left knee here for presurgical examination for planned Robotic assisted left knee arthroplasty on 3/18/2024 with Dr. López. Denies history of ischemic heart disease, CHF, CVA, TIA, or Kidney Disease. Denies resting or exertional chest pain, palpitations, headache, N/V, SOB, syncope or blurry vision. Denies personal or family hx of bleeding disorder or adverse reaction with anesthesia. Denies hx of DVT or PE. Denies recent travels in the past 30 days. No fever, SOB, cough, flu-like symptoms or body rash covid screen.  Goal: To walk pain free.

## 2024-03-05 NOTE — H&P PST ADULT - MUSCULOSKELETAL
left knee/no calf tenderness/decreased ROM due to pain/strength 5/5 bilateral upper extremities/strength 5/5 bilateral lower extremities/abnormal gait details…

## 2024-03-05 NOTE — OCCUPATIONAL THERAPY INITIAL EVALUATION ADULT - ADDITIONAL COMMENTS
At this time, pt is functioning in his roles, self sufficient, driving & ambulating independently in the community without any assistive devices.  Pt owns a straight cane and axillary crutches. Pt has extension lag in his left knee and an antalgic gait. Pt c/o varying pain in his left knee with highest intensity 6/10. The pain is exacerbated, by walking, prolonged standing, negotiating steps and is relieved with Tylenol or Advil. Pt is right hand dominant and wears glasses and wears glasses all the time.

## 2024-03-05 NOTE — H&P PST ADULT - NSANTHOSAYNRD_GEN_A_CORE
No. REESE screening performed.  STOP BANG Legend: 0-2 = LOW Risk; 3-4 = INTERMEDIATE Risk; 5-8 = HIGH Risk

## 2024-03-05 NOTE — H&P PST ADULT - PROBLEM SELECTOR PLAN 4
continue meds  Hold jardiance 3 days before surgery.  Hold metformin morning of surgery.  Hold Ozempic 1 week before surgery.

## 2024-03-05 NOTE — OCCUPATIONAL THERAPY INITIAL EVALUATION ADULT - LIVES WITH, PROFILE
in a private house with 2 entry steps equipped with bilateral hand rails that cannot be reached simultaneously.  Once inside, pt has to negotiate a flight of stairs, with 13 steps, right ascending handrail to access the bedroom and bathroom.  The bathroom has a stall shower, fixed / retractable shower head and comfort height toilet with adequate space to fit a commode over it./spouse

## 2024-03-05 NOTE — H&P PST ADULT - PROBLEM SELECTOR PLAN 5
Assessment and Plan: labs - cbc,pt/ptt,inr,cmp,t&s,nose cx,ekg  Medical clearance required  preop 3 day hibiclens instruction reviewed and given .instructed on if  nose cx positive use mupirocin 5 days and checklist given  take routine meds DOS with sips of water. avoid NSAID and OTC supplements. verbalized understanding  information on proper nutrition , increase protein and better food choices provided in packet  ensure clear not given 2/2 DM  anesthesiologist to review pst labs, ekg, medical clearances and optimization for surgery

## 2024-03-05 NOTE — H&P PST ADULT - PROBLEM SELECTOR PLAN 3
"Chief Complaint   Patient presents with     RECHECK     ER f/u     Anxiety     Refill meds       Initial /81 mmHg  Pulse 104  Wt 153 lb (69.4 kg)  SpO2 96% Estimated body mass index is 27.11 kg/(m^2) as calculated from the following:    Height as of 1/13/17: 5' 3\" (1.6 m).    Weight as of this encounter: 153 lb (69.4 kg).  BP completed using cuff size: regular    Lupe Schwarz CMA    " continue meds

## 2024-03-05 NOTE — OCCUPATIONAL THERAPY INITIAL EVALUATION ADULT - LIGHT TOUCH SENSATION, TRUNK, REHAB EVAL
[Normal] : affect was normal and insight and judgment were intact [de-identified] : Unsteady gait is more pronounced  within normal limits

## 2024-03-05 NOTE — OCCUPATIONAL THERAPY INITIAL EVALUATION ADULT - RANGE OF MOTION EXAMINATION, LOWER EXTREMITY
ROM is limited in left knee due to pain/Left LE Passive ROM was WFL (w/i functional limits)/Left LE Active Assistive ROM was WFL (within functional limits)

## 2024-03-07 RX ORDER — MUPIROCIN 20 MG/G
1 OINTMENT TOPICAL
Qty: 22 | Refills: 0
Start: 2024-03-07 | End: 2024-03-11

## 2024-03-07 NOTE — HEALTH RISK ASSESSMENT
[Yes] : Yes [2 - 3 times a week (3 pts)] : 2 - 3  times a week (3 points) [5 or 6 (2 pts)] : 5 or 6 (2  points) [Weekly (3 pts)] : Weekly (3 points) [No] : In the past 12 months have you used drugs other than those required for medical reasons? No [0] : 2) Feeling down, depressed, or hopeless: Not at all (0) [PHQ-2 Negative - No further assessment needed] : PHQ-2 Negative - No further assessment needed [Patient declined Retinal Exam] : Patient declined Retinal Exam. [HIV test declined] : HIV test declined [Hepatitis C test declined] : Hepatitis C test declined [With Family] : lives with family [Employed] : employed [] :  [# Of Children ___] : has [unfilled] children [Reviewed no changes] : Reviewed, no changes [Never] : Never [Excellent] : ~his/her~  mood as  excellent [Patient reported colonoscopy was abnormal] : Patient reported colonoscopy was abnormal [Audit-CScore] : 8 [OQV2Inpjt] : 0 [EyeExamDate] : 05/22 [Change in mental status noted] : No change in mental status noted [Reports changes in hearing] : Reports no changes in hearing [ColonoscopyDate] : 01/22 [Reports changes in vision] : Reports no changes in vision [AdvancecareDate] : 02/24

## 2024-03-07 NOTE — ASSESSMENT
[FreeTextEntry1] : -PMH: T2DM, HTN, HLD, Transaminitis 2/2 Hepatic Steatosis -SH: . 2 children. Account . >4 EtOH Beverages/day. Non-Smoker.  YOANA is a 58 year M whom is here today for CPE  Specialists Involved: -Optho: Dr. Moran ([phone])  EKG obtained in office today demonstrates NSR. normal axis/Intervals. Good-R-wave progression. Normal EKG.   -F/u labs drawn in office today -Further recs pending lab results -Still needs to f/u w/ GI (Colonoscopy & Elevated LFTs) -Continue annual f/u w/ Optho (Dilated diabetic eye exam) -RTO 3mo for routine f/u or sooner if needed

## 2024-03-07 NOTE — HISTORY OF PRESENT ILLNESS
[FreeTextEntry1] : ANnual well visit [de-identified] : -PMH: T2DM, HTN, HLD, Transaminitis 2/2 Hepatic Steatosis -SH: . 2 children. Account . >4 EtOH Beverages/day. Non-Smoker.  YOANA is a 58 year M whom is here today for CPE Today, pt reports feeling well and is w/o complaints  Specialists Involved: -Optho: Dr. Moran ([phone])  -Vaccines: Needs Prevnar 20, Shingles, TDap (Declines at this time) -Colonoscopy: 1/2022. Repeat 3 yrs -FH of Prostate, Colon, breast or ovarian CA: None known  -T2DM: Remains on Jardiance 25mg QD & Metformin 1000mg BID. On ASA & Statin. Non-Compliant w/ BG Monitoring. (12/2023) A1c 7.3. (5/2023) Optho Eval: No Retinopathy. No reported changes.  -HTN: Remains on HCTZ 25mg & Losartan 100mg. No reported changes. -HLD: Remains on Atorvastatin 40mg QHS. No reported changes.  -Hepatic Steatosis: (2018) CT Abd demonstrated diffuse steatosis. He admits to binge episodes of EtOH. Not interested in cutting back. increased in LFTs, pt was therefore referred to GI still not consulted with

## 2024-03-07 NOTE — ADDENDUM
[FreeTextEntry1] : Presurgical testing labs reviewed PT/PTT/INR, CBC WNL A1c 7.9 CMP: AST//149 Patient with known chronic elevated LFTs for which she has been referred to GI/hepatology on multiple occasions and is still recommended.  No contraindication to proceeding with surgery given elevated LFTs A1c 7.9.  This is an elective surgery. In order to help minimize infection risk I would recommend trying to achieve improved blood glucose levels prior to surgery. The decision to proceed with surgery is at the discretion of the operating surgeon.

## 2024-03-15 RX ORDER — ASCORBIC ACID 60 MG
500 TABLET,CHEWABLE ORAL
Refills: 0 | Status: DISCONTINUED | OUTPATIENT
Start: 2024-03-18 | End: 2024-03-19

## 2024-03-15 RX ORDER — SODIUM CHLORIDE 9 MG/ML
1000 INJECTION, SOLUTION INTRAVENOUS
Refills: 0 | Status: DISCONTINUED | OUTPATIENT
Start: 2024-03-18 | End: 2024-03-19

## 2024-03-15 RX ORDER — DEXTROSE 50 % IN WATER 50 %
25 SYRINGE (ML) INTRAVENOUS ONCE
Refills: 0 | Status: DISCONTINUED | OUTPATIENT
Start: 2024-03-18 | End: 2024-03-19

## 2024-03-15 RX ORDER — ONDANSETRON 8 MG/1
4 TABLET, FILM COATED ORAL EVERY 6 HOURS
Refills: 0 | Status: DISCONTINUED | OUTPATIENT
Start: 2024-03-18 | End: 2024-03-19

## 2024-03-15 RX ORDER — OXYCODONE HYDROCHLORIDE 5 MG/1
10 TABLET ORAL EVERY 4 HOURS
Refills: 0 | Status: DISCONTINUED | OUTPATIENT
Start: 2024-03-18 | End: 2024-03-19

## 2024-03-15 RX ORDER — SENNA PLUS 8.6 MG/1
2 TABLET ORAL AT BEDTIME
Refills: 0 | Status: DISCONTINUED | OUTPATIENT
Start: 2024-03-18 | End: 2024-03-19

## 2024-03-15 RX ORDER — DEXTROSE 50 % IN WATER 50 %
12.5 SYRINGE (ML) INTRAVENOUS ONCE
Refills: 0 | Status: DISCONTINUED | OUTPATIENT
Start: 2024-03-18 | End: 2024-03-19

## 2024-03-15 RX ORDER — LOSARTAN POTASSIUM 100 MG/1
100 TABLET, FILM COATED ORAL DAILY
Refills: 0 | Status: DISCONTINUED | OUTPATIENT
Start: 2024-03-18 | End: 2024-03-19

## 2024-03-15 RX ORDER — CELECOXIB 200 MG/1
200 CAPSULE ORAL EVERY 12 HOURS
Refills: 0 | Status: DISCONTINUED | OUTPATIENT
Start: 2024-03-19 | End: 2024-03-19

## 2024-03-15 RX ORDER — POLYETHYLENE GLYCOL 3350 17 G/17G
17 POWDER, FOR SOLUTION ORAL AT BEDTIME
Refills: 0 | Status: DISCONTINUED | OUTPATIENT
Start: 2024-03-18 | End: 2024-03-19

## 2024-03-15 RX ORDER — PANTOPRAZOLE SODIUM 20 MG/1
40 TABLET, DELAYED RELEASE ORAL
Refills: 0 | Status: DISCONTINUED | OUTPATIENT
Start: 2024-03-18 | End: 2024-03-19

## 2024-03-15 RX ORDER — OXYCODONE HYDROCHLORIDE 5 MG/1
5 TABLET ORAL EVERY 4 HOURS
Refills: 0 | Status: DISCONTINUED | OUTPATIENT
Start: 2024-03-18 | End: 2024-03-19

## 2024-03-15 RX ORDER — ATORVASTATIN CALCIUM 80 MG/1
40 TABLET, FILM COATED ORAL AT BEDTIME
Refills: 0 | Status: DISCONTINUED | OUTPATIENT
Start: 2024-03-18 | End: 2024-03-19

## 2024-03-15 RX ORDER — INSULIN LISPRO 100/ML
VIAL (ML) SUBCUTANEOUS
Refills: 0 | Status: DISCONTINUED | OUTPATIENT
Start: 2024-03-18 | End: 2024-03-19

## 2024-03-15 RX ORDER — DEXTROSE 50 % IN WATER 50 %
15 SYRINGE (ML) INTRAVENOUS ONCE
Refills: 0 | Status: DISCONTINUED | OUTPATIENT
Start: 2024-03-18 | End: 2024-03-19

## 2024-03-15 RX ORDER — ACETAMINOPHEN 500 MG
650 TABLET ORAL EVERY 6 HOURS
Refills: 0 | Status: DISCONTINUED | OUTPATIENT
Start: 2024-03-18 | End: 2024-03-19

## 2024-03-15 RX ORDER — METFORMIN HYDROCHLORIDE 850 MG/1
1000 TABLET ORAL
Refills: 0 | Status: DISCONTINUED | OUTPATIENT
Start: 2024-03-18 | End: 2024-03-19

## 2024-03-15 RX ORDER — GLUCAGON INJECTION, SOLUTION 0.5 MG/.1ML
1 INJECTION, SOLUTION SUBCUTANEOUS ONCE
Refills: 0 | Status: DISCONTINUED | OUTPATIENT
Start: 2024-03-18 | End: 2024-03-19

## 2024-03-15 RX ORDER — INSULIN LISPRO 100/ML
VIAL (ML) SUBCUTANEOUS AT BEDTIME
Refills: 0 | Status: DISCONTINUED | OUTPATIENT
Start: 2024-03-18 | End: 2024-03-19

## 2024-03-15 RX ORDER — ASPIRIN/CALCIUM CARB/MAGNESIUM 324 MG
81 TABLET ORAL
Refills: 0 | Status: DISCONTINUED | OUTPATIENT
Start: 2024-03-19 | End: 2024-03-19

## 2024-03-15 RX ORDER — HYDROMORPHONE HYDROCHLORIDE 2 MG/ML
0.5 INJECTION INTRAMUSCULAR; INTRAVENOUS; SUBCUTANEOUS
Refills: 0 | Status: COMPLETED | OUTPATIENT
Start: 2024-03-18 | End: 2024-03-25

## 2024-03-17 ENCOUNTER — TRANSCRIPTION ENCOUNTER (OUTPATIENT)
Age: 59
End: 2024-03-17

## 2024-03-18 ENCOUNTER — TRANSCRIPTION ENCOUNTER (OUTPATIENT)
Age: 59
End: 2024-03-18

## 2024-03-18 ENCOUNTER — APPOINTMENT (OUTPATIENT)
Dept: ORTHOPEDIC SURGERY | Facility: HOSPITAL | Age: 59
End: 2024-03-18
Payer: COMMERCIAL

## 2024-03-18 ENCOUNTER — INPATIENT (INPATIENT)
Facility: HOSPITAL | Age: 59
LOS: 0 days | Discharge: HOME HEALTH | End: 2024-03-19
Attending: STUDENT IN AN ORGANIZED HEALTH CARE EDUCATION/TRAINING PROGRAM | Admitting: STUDENT IN AN ORGANIZED HEALTH CARE EDUCATION/TRAINING PROGRAM
Payer: COMMERCIAL

## 2024-03-18 VITALS
OXYGEN SATURATION: 97 % | DIASTOLIC BLOOD PRESSURE: 95 MMHG | WEIGHT: 222.01 LBS | HEART RATE: 74 BPM | SYSTOLIC BLOOD PRESSURE: 170 MMHG | TEMPERATURE: 98 F | HEIGHT: 69 IN | RESPIRATION RATE: 17 BRPM

## 2024-03-18 DIAGNOSIS — Z98.890 OTHER SPECIFIED POSTPROCEDURAL STATES: Chronic | ICD-10-CM

## 2024-03-18 LAB
ANION GAP SERPL CALC-SCNC: 7 MMOL/L — SIGNIFICANT CHANGE UP (ref 5–17)
BUN SERPL-MCNC: 16 MG/DL — SIGNIFICANT CHANGE UP (ref 7–23)
CALCIUM SERPL-MCNC: 8.9 MG/DL — SIGNIFICANT CHANGE UP (ref 8.5–10.1)
CHLORIDE SERPL-SCNC: 111 MMOL/L — HIGH (ref 96–108)
CO2 SERPL-SCNC: 26 MMOL/L — SIGNIFICANT CHANGE UP (ref 22–31)
CREAT SERPL-MCNC: 0.9 MG/DL — SIGNIFICANT CHANGE UP (ref 0.5–1.3)
EGFR: 100 ML/MIN/1.73M2 — SIGNIFICANT CHANGE UP
GLUCOSE SERPL-MCNC: 205 MG/DL — HIGH (ref 70–99)
HCT VFR BLD CALC: 42.9 % — SIGNIFICANT CHANGE UP (ref 39–50)
HGB BLD-MCNC: 14.8 G/DL — SIGNIFICANT CHANGE UP (ref 13–17)
MCHC RBC-ENTMCNC: 31 PG — SIGNIFICANT CHANGE UP (ref 27–34)
MCHC RBC-ENTMCNC: 34.5 G/DL — SIGNIFICANT CHANGE UP (ref 32–36)
MCV RBC AUTO: 89.9 FL — SIGNIFICANT CHANGE UP (ref 80–100)
NRBC # BLD: 0 /100 WBCS — SIGNIFICANT CHANGE UP (ref 0–0)
PLATELET # BLD AUTO: 146 K/UL — LOW (ref 150–400)
POTASSIUM SERPL-MCNC: 4.2 MMOL/L — SIGNIFICANT CHANGE UP (ref 3.5–5.3)
POTASSIUM SERPL-SCNC: 4.2 MMOL/L — SIGNIFICANT CHANGE UP (ref 3.5–5.3)
RBC # BLD: 4.77 M/UL — SIGNIFICANT CHANGE UP (ref 4.2–5.8)
RBC # FLD: 12.5 % — SIGNIFICANT CHANGE UP (ref 10.3–14.5)
SODIUM SERPL-SCNC: 144 MMOL/L — SIGNIFICANT CHANGE UP (ref 135–145)
WBC # BLD: 5.43 K/UL — SIGNIFICANT CHANGE UP (ref 3.8–10.5)
WBC # FLD AUTO: 5.43 K/UL — SIGNIFICANT CHANGE UP (ref 3.8–10.5)

## 2024-03-18 PROCEDURE — 27447 TOTAL KNEE ARTHROPLASTY: CPT | Mod: LT

## 2024-03-18 PROCEDURE — 20985 CPTR-ASST DIR MS PX: CPT

## 2024-03-18 DEVICE — MAKO BONE PIN 3.2MM X 110MM: Type: IMPLANTABLE DEVICE | Site: LEFT | Status: FUNCTIONAL

## 2024-03-18 DEVICE — MAKO BONE PIN 3.2MM X 140MM: Type: IMPLANTABLE DEVICE | Site: LEFT | Status: FUNCTIONAL

## 2024-03-18 DEVICE — PATELLA TRIATHLON ASSYM SZ A3X10MM: Type: IMPLANTABLE DEVICE | Site: LEFT | Status: FUNCTIONAL

## 2024-03-18 DEVICE — CEMENT PALACOS R: Type: IMPLANTABLE DEVICE | Site: LEFT | Status: FUNCTIONAL

## 2024-03-18 DEVICE — BASEPLATE TIB TRIATHLON TRITAN SZ 7: Type: IMPLANTABLE DEVICE | Site: LEFT | Status: FUNCTIONAL

## 2024-03-18 DEVICE — FEMORAL CRUCIATE RETAINING SZ 6 LT: Type: IMPLANTABLE DEVICE | Site: LEFT | Status: FUNCTIONAL

## 2024-03-18 DEVICE — INSERT TIB BEARING CS X3 SZ 7 9MM: Type: IMPLANTABLE DEVICE | Site: LEFT | Status: FUNCTIONAL

## 2024-03-18 RX ORDER — EMPAGLIFLOZIN 10 MG/1
1 TABLET, FILM COATED ORAL
Refills: 0 | DISCHARGE

## 2024-03-18 RX ORDER — SEMAGLUTIDE 0.68 MG/ML
0.5 INJECTION, SOLUTION SUBCUTANEOUS
Refills: 0 | DISCHARGE

## 2024-03-18 RX ORDER — CELECOXIB 200 MG/1
200 CAPSULE ORAL ONCE
Refills: 0 | Status: COMPLETED | OUTPATIENT
Start: 2024-03-18 | End: 2024-03-18

## 2024-03-18 RX ORDER — TADALAFIL 10 MG/1
1 TABLET, FILM COATED ORAL
Refills: 0 | DISCHARGE

## 2024-03-18 RX ORDER — HYDROCHLOROTHIAZIDE 25 MG
1 TABLET ORAL
Refills: 0 | DISCHARGE

## 2024-03-18 RX ORDER — LOSARTAN POTASSIUM 100 MG/1
1 TABLET, FILM COATED ORAL
Refills: 0 | DISCHARGE

## 2024-03-18 RX ORDER — SODIUM CHLORIDE 9 MG/ML
1000 INJECTION, SOLUTION INTRAVENOUS
Refills: 0 | Status: DISCONTINUED | OUTPATIENT
Start: 2024-03-18 | End: 2024-03-18

## 2024-03-18 RX ORDER — DEXAMETHASONE 0.5 MG/5ML
10 ELIXIR ORAL ONCE
Refills: 0 | Status: COMPLETED | OUTPATIENT
Start: 2024-03-18 | End: 2024-03-19

## 2024-03-18 RX ORDER — ACETAMINOPHEN 500 MG
650 TABLET ORAL ONCE
Refills: 0 | Status: COMPLETED | OUTPATIENT
Start: 2024-03-18 | End: 2024-03-18

## 2024-03-18 RX ORDER — ATORVASTATIN CALCIUM 80 MG/1
1 TABLET, FILM COATED ORAL
Refills: 0 | DISCHARGE

## 2024-03-18 RX ORDER — HYDROMORPHONE HYDROCHLORIDE 2 MG/ML
0.5 INJECTION INTRAMUSCULAR; INTRAVENOUS; SUBCUTANEOUS
Refills: 0 | Status: DISCONTINUED | OUTPATIENT
Start: 2024-03-18 | End: 2024-03-19

## 2024-03-18 RX ORDER — SODIUM CHLORIDE 9 MG/ML
3 INJECTION INTRAMUSCULAR; INTRAVENOUS; SUBCUTANEOUS EVERY 8 HOURS
Refills: 0 | Status: DISCONTINUED | OUTPATIENT
Start: 2024-03-18 | End: 2024-03-18

## 2024-03-18 RX ORDER — ACETAMINOPHEN 500 MG
1000 TABLET ORAL ONCE
Refills: 0 | Status: COMPLETED | OUTPATIENT
Start: 2024-03-18 | End: 2024-03-18

## 2024-03-18 RX ORDER — METFORMIN HYDROCHLORIDE 850 MG/1
1 TABLET ORAL
Refills: 0 | DISCHARGE

## 2024-03-18 RX ORDER — ONDANSETRON 8 MG/1
4 TABLET, FILM COATED ORAL ONCE
Refills: 0 | Status: DISCONTINUED | OUTPATIENT
Start: 2024-03-18 | End: 2024-03-18

## 2024-03-18 RX ORDER — HYDROMORPHONE HYDROCHLORIDE 2 MG/ML
0.5 INJECTION INTRAMUSCULAR; INTRAVENOUS; SUBCUTANEOUS
Refills: 0 | Status: DISCONTINUED | OUTPATIENT
Start: 2024-03-18 | End: 2024-03-18

## 2024-03-18 RX ORDER — CEFAZOLIN SODIUM 1 G
2000 VIAL (EA) INJECTION EVERY 8 HOURS
Refills: 0 | Status: COMPLETED | OUTPATIENT
Start: 2024-03-18 | End: 2024-03-19

## 2024-03-18 RX ADMIN — HYDROMORPHONE HYDROCHLORIDE 0.5 MILLIGRAM(S): 2 INJECTION INTRAMUSCULAR; INTRAVENOUS; SUBCUTANEOUS at 23:39

## 2024-03-18 RX ADMIN — Medication 1: at 16:31

## 2024-03-18 RX ADMIN — ATORVASTATIN CALCIUM 40 MILLIGRAM(S): 80 TABLET, FILM COATED ORAL at 21:25

## 2024-03-18 RX ADMIN — CELECOXIB 200 MILLIGRAM(S): 200 CAPSULE ORAL at 10:16

## 2024-03-18 RX ADMIN — Medication 100 MILLIGRAM(S): at 21:25

## 2024-03-18 RX ADMIN — Medication 500 MILLIGRAM(S): at 17:25

## 2024-03-18 RX ADMIN — LOSARTAN POTASSIUM 100 MILLIGRAM(S): 100 TABLET, FILM COATED ORAL at 17:25

## 2024-03-18 RX ADMIN — OXYCODONE HYDROCHLORIDE 10 MILLIGRAM(S): 5 TABLET ORAL at 21:25

## 2024-03-18 RX ADMIN — Medication 400 MILLIGRAM(S): at 23:39

## 2024-03-18 RX ADMIN — SODIUM CHLORIDE 115 MILLILITER(S): 9 INJECTION, SOLUTION INTRAVENOUS at 16:33

## 2024-03-18 RX ADMIN — METFORMIN HYDROCHLORIDE 1000 MILLIGRAM(S): 850 TABLET ORAL at 17:25

## 2024-03-18 RX ADMIN — OXYCODONE HYDROCHLORIDE 10 MILLIGRAM(S): 5 TABLET ORAL at 22:25

## 2024-03-18 RX ADMIN — Medication 1: at 21:41

## 2024-03-18 RX ADMIN — Medication 650 MILLIGRAM(S): at 10:16

## 2024-03-18 RX ADMIN — Medication 650 MILLIGRAM(S): at 18:25

## 2024-03-18 RX ADMIN — SODIUM CHLORIDE 125 MILLILITER(S): 9 INJECTION, SOLUTION INTRAVENOUS at 15:24

## 2024-03-18 RX ADMIN — Medication 650 MILLIGRAM(S): at 17:25

## 2024-03-18 NOTE — DISCHARGE NOTE PROVIDER - CARE PROVIDER_API CALL
Paulino López  Orthopaedic Surgery  [address], [address]  [place] [address]  Phone: [phone]  Fax: [phone]  Follow Up Time:

## 2024-03-18 NOTE — PHYSICAL THERAPY INITIAL EVALUATION ADULT - RANGE OF MOTION EXAMINATION, REHAB EVAL
L knee ROM; flexion 85 degrees, extension - 5 degrees./Right LE ROM was WFL (within functional limits)/deficits as listed below

## 2024-03-18 NOTE — PHYSICAL THERAPY INITIAL EVALUATION ADULT - ACTIVE RANGE OF MOTION EXAMINATION, REHAB EVAL
L knee ROM; flexion 85 degrees, extension - 5 degrees/Right LE Active ROM was WFL (within functional limits)/deficits as listed below

## 2024-03-18 NOTE — PATIENT PROFILE ADULT - FALL HARM RISK - HARM RISK INTERVENTIONS
Assistance with ambulation/Assistance OOB with selected safe patient handling equipment/Communicate Risk of Fall with Harm to all staff/Discuss with provider need for PT consult/Monitor gait and stability/Provide patient with walking aids - walker, cane, crutches/Reinforce activity limits and safety measures with patient and family/Review medications for side effects contributing to fall risk/Sit up slowly, dangle for a short time, stand at bedside before walking/Tailored Fall Risk Interventions/Toileting schedule using arm’s reach rule for commode and bathroom/Use of alarms - bed, chair and/or voice tab/Visual Cue: Yellow wristband and red socks/Bed in lowest position, wheels locked, appropriate side rails in place/Call bell, personal items and telephone in reach/Instruct patient to call for assistance before getting out of bed or chair/Non-slip footwear when patient is out of bed/[place] to call system/Physically safe environment - no spills, clutter or unnecessary equipment/Purposeful Proactive Rounding/Room/bathroom lighting operational, light cord in reach

## 2024-03-18 NOTE — OCCUPATIONAL THERAPY INITIAL EVALUATION ADULT - GENERAL OBSERVATIONS, REHAB EVAL
Pt encountered semi supine in bed, NAD, all lines intact, pt reported no pain s/p Left TKA, pt agreeable to OT eval, PT Will present, pt spouse at bedside.

## 2024-03-18 NOTE — PHYSICAL THERAPY INITIAL EVALUATION ADULT - LEVEL OF INDEPENDENCE: STAND/SIT, REHAB EVAL
Patient:   DANIEL HURTADO JR            MRN: [id number]            FIN: [id number]              [facility] under Dr. Escoto   -supervision

## 2024-03-18 NOTE — DISCHARGE NOTE PROVIDER - NSDCMRMEDTOKEN_GEN_ALL_CORE_FT
aspirin 81 mg oral capsule: 1 cap(s) orally once a day  atorvastatin 40 mg oral tablet: 1 tab(s) orally once a day  Cialis 10 mg oral tablet: 1 tab(s) orally prn  hydroCHLOROthiazide 25 mg oral tablet: 1 tab(s) orally once a day  Jardiance 25 mg oral tablet: 1 tab(s) orally once a day  losartan 100 mg oral tablet: 1 tab(s) orally once a day  MetFORMIN (Eqv-Fortamet) 1000 mg oral tablet, extended release: 1 tab(s) orally 2 times a day  mupirocin 2% topical ointment: Apply topically to affected area 2 times a day   acetaminophen 325 mg oral tablet: 2 tab(s) orally every 6 hours  ascorbic acid 500 mg oral tablet: 1 tab(s) orally 2 times a day  aspirin 81 mg oral delayed release tablet: 1 tab(s) orally 2 times a day  atorvastatin 40 mg oral tablet: 1 tab(s) orally once a day  celecoxib 200 mg oral capsule: 1 cap(s) orally every 12 hours  Cialis 10 mg oral tablet: 1 tab(s) orally prn  hydroCHLOROthiazide 25 mg oral tablet: 1 tab(s) orally once a day  Jardiance 25 mg oral tablet: 1 tab(s) orally once a day  losartan 100 mg oral tablet: 1 tab(s) orally once a day  MetFORMIN (Eqv-Fortamet) 1000 mg oral tablet, extended release: 1 tab(s) orally 2 times a day  Multiple Vitamins oral tablet: 1 tab(s) orally once a day  Narcan 4 mg/0.1 mL nasal spray: 4 milligram(s) intranasally once , repeat as necessary.   As needed. For suspected opiate overdose   Follow instructions on packet MDD: 0.2 ml  oxyCODONE 5 mg oral tablet: 1 tab(s) orally every 4 hours as needed for  pain 1 tab for mild/moderate pain, 2 tabs for severe pain MDD: 6  pantoprazole 40 mg oral delayed release tablet: 1 tab(s) orally once a day (before a meal) MDD: 1  polyethylene glycol 3350 oral powder for reconstitution: 17 gram(s) orally once a day (at bedtime)  senna leaf extract oral tablet: 2 tab(s) orally once a day (at bedtime)

## 2024-03-18 NOTE — ASU PATIENT PROFILE, ADULT - FALL HARM RISK - UNIVERSAL INTERVENTIONS
Bed in lowest position, wheels locked, appropriate side rails in place/Call bell, personal items and telephone in reach/Instruct patient to call for assistance before getting out of bed or chair/Non-slip footwear when patient is out of bed/Holiday to call system/Physically safe environment - no spills, clutter or unnecessary equipment/Purposeful Proactive Rounding/Room/bathroom lighting operational, light cord in reach

## 2024-03-18 NOTE — DISCHARGE NOTE PROVIDER - HOSPITAL COURSE
58yMale with history of OA presenting for L TKA with Dr. López on 3/18/24. Risk and benefits of surgery were explained to the patient. The patient understood and agreed to proceed with surgery. Patient underwent the procedure with no intraoperative complications. Pt was brought in stable condition to the PACU. Once stable in PACU, pt was brought to the floor. During hospital stay pt was followed by Medicine, physical therapy, Home Care during this admission. Pt is stable for discharge to 58yMale with history of OA presenting for L TKA with Dr. López on 3/18/24. Risk and benefits of surgery were explained to the patient. The patient understood and agreed to proceed with surgery. Patient underwent the procedure with no intraoperative complications. Pt was brought in stable condition to the PACU. Once stable in PACU, pt was brought to the floor. During hospital stay pt was followed by Medicine, physical therapy, Home Care during this admission. Pt is stable for discharge to home on POD#1.

## 2024-03-18 NOTE — ASU PATIENT PROFILE, ADULT - ANESTHESIA, PREVIOUS REACTION, PROFILE
Quality 431: Preventive Care And Screening: Unhealthy Alcohol Use - Screening: Patient screened for unhealthy alcohol use using a single question and scores less than 2 times per year Detail Level: Detailed Quality 226: Preventive Care And Screening: Tobacco Use: Screening And Cessation Intervention: Patient screened for tobacco use and is an ex/non-smoker Quality 130: Documentation Of Current Medications In The Medical Record: Current Medications Documented none

## 2024-03-18 NOTE — PROGRESS NOTE ADULT - SUBJECTIVE AND OBJECTIVE BOX
Patient is s/p L TKA under spinal anesthesia POD#0. Pt tolerated procedure well without any intra-op complications. Pt doing well at this time.  Denies CP/SOB/Dizziness/N/V/D/HA. Pain is controlled.     Vital Signs Last 24 Hrs  T(C): 36.8 (18 Mar 2024 17:45), Max: 36.8 (18 Mar 2024 17:45)  T(F): 98.2 (18 Mar 2024 17:45), Max: 98.2 (18 Mar 2024 17:45)  HR: 86 (18 Mar 2024 17:45) (63 - 94)  BP: 141/82 (18 Mar 2024 17:45) (119/80 - 170/95)  BP(mean): --  RR: 17 (18 Mar 2024 17:45) (12 - 17)  SpO2: 95% (18 Mar 2024 17:45) (92% - 98%)    Parameters below as of 18 Mar 2024 17:45  Patient On (Oxygen Delivery Method): room air      GEN: NAD  L Knee: Dressing C/D/I.   B/L LE's: Motor intact + EHL/FHL/TA/GS in the BL LE. Sensation is grossly intact B/L. Extremities warm B/L. Compartments soft, compressible B/L, no calf tenderness B/L. DP 2+ B/L.    Labs:                          14.8   5.43  )-----------( 146      ( 18 Mar 2024 15:25 )             42.9       03-18    144  |  111<H>  |  16  ----------------------------<  205<H>  4.2   |  26  |  0.90    Ca    8.9      18 Mar 2024 15:25      A/P: Patient is a 58y y/o Male s/p L TKA, POD #0  -wound care, isometric exercises, GI motility, new medications, hospital course and discharge planning reviewed with pt  -Pain control/analgesia  -Inc spirometry reviewed and counseled  -SCD's to B/L LE  -F/U AM Labs  -PT/OT/WBAT  -Antibiotic 24hours post-op  -Anticoagulation: ASA BID starting POD#1  Discharge: Pending

## 2024-03-18 NOTE — CONSULT NOTE ADULT - SUBJECTIVE AND OBJECTIVE BOX
YOANA LEON is a 58y Male s/p LEFT TOTTAL KNEE ARTHROPLASTY WITH MARLEY ROBOTIC ASSIST      w/ h/o HTN (hypertension)    HLD (hyperlipidemia)    DM (diabetes mellitus)      denies any chest pain shortness of breath palpitation dizziness lightheadedness nausea vomiting fever or chills    History of arthroscopy of left knee      No pertinent family history in first degree relatives      SH: doesnot smoke or drink at this time    No Known Allergies    acetaminophen     Tablet .. 650 milliGRAM(s) Oral every 6 hours  acetaminophen   IVPB .. 1000 milliGRAM(s) IV Intermittent once  ascorbic acid 500 milliGRAM(s) Oral two times a day  atorvastatin 40 milliGRAM(s) Oral at bedtime  ceFAZolin   IVPB 2000 milliGRAM(s) IV Intermittent every 8 hours  dexAMETHasone  IVPB 10 milliGRAM(s) IV Intermittent once  dextrose 5%. 1000 milliLiter(s) IV Continuous <Continuous>  dextrose 5%. 1000 milliLiter(s) IV Continuous <Continuous>  dextrose 50% Injectable 12.5 Gram(s) IV Push once  dextrose 50% Injectable 25 Gram(s) IV Push once  dextrose 50% Injectable 25 Gram(s) IV Push once  dextrose Oral Gel 15 Gram(s) Oral once PRN  glucagon  Injectable 1 milliGRAM(s) IntraMuscular once  hydrochlorothiazide 25 milliGRAM(s) Oral daily  HYDROmorphone  Injectable 0.5 milliGRAM(s) IV Push every 3 hours PRN  insulin lispro (ADMELOG) corrective regimen sliding scale   SubCutaneous three times a day before meals  insulin lispro (ADMELOG) corrective regimen sliding scale   SubCutaneous at bedtime  lactated ringers. 1000 milliLiter(s) IV Continuous <Continuous>  losartan 100 milliGRAM(s) Oral daily  metFORMIN 1000 milliGRAM(s) Oral two times a day  multivitamin 1 Tablet(s) Oral daily  ondansetron Injectable 4 milliGRAM(s) IV Push every 6 hours PRN  oxyCODONE    IR 10 milliGRAM(s) Oral every 4 hours PRN  oxyCODONE    IR 5 milliGRAM(s) Oral every 4 hours PRN  pantoprazole    Tablet 40 milliGRAM(s) Oral before breakfast  polyethylene glycol 3350 17 Gram(s) Oral at bedtime  senna 2 Tablet(s) Oral at bedtime    T(C): 36.8 (03-18-24 @ 18:45), Max: 36.8 (03-18-24 @ 17:45)  HR: 86 (03-18-24 @ 18:45) (63 - 94)  BP: 156/91 (03-18-24 @ 18:45) (119/80 - 170/95)  RR: 17 (03-18-24 @ 18:45) (12 - 17)  SpO2: 93% (03-18-24 @ 18:45) (92% - 98%)  HEENT unremarkable  neck no JVD or bruit  heart normal S1 S2 RRR no gallops or rubs  chest clear to auscultation  abd sof nontender non distended +bs  ext no calf tenderness    A/P   DVT PX  pain control  bowel regimen   wound care as per ortho  GI PX  antiemetics prn  incentive spirometer

## 2024-03-18 NOTE — DISCHARGE NOTE PROVIDER - NSDCFUADDINST_GEN_ALL_CORE_FT
Keep knee straight while at rest. Elevate the leg as much as possible ("toes above the nose") to help control swelling. Make sure you get up and take a brief walk every two hours to help with circulation and prevent stiffness. Incentive spirometer 10X/hour. Cryocuff to help with pain/inflammation (20 mins on, 20 mins off). Keep towel under ankle when resting to keep leg straight.     Keep CHERYL Dressing Clean, Dry and Intact. May shower with CHERYL Dressing. Please do not scrub, soak, peel or pick at the CHERYL dressing. No creams, lotions, powders, or oils over dressing. May shower and let water run over dressing, no baths. Pat dry once out of shower. Dressing to be removed in 7 days. If dressing is saturated from border to border - may remove and replace with clean dry dressing.    Shower instructions for CHERYL Dressing: Turn battery pack off. Twist OFF battery pack before entering shower. Once done with showering. Pat dressing dry. Reconnect and twist ON battery pack after you are dry. Then turn battery pack on.     There are no staples or stitches that need to be removed.  If you notice any redness, irritation, or itching around the dressing call the surgeon's office.     Keep knee straight while at rest. Elevate the leg as much as possible ("toes above the nose") to help control swelling. Make sure you get up and take a brief walk every two hours to help with circulation and prevent stiffness. Incentive spirometer 10X/hour. Cryocuff to help with pain/inflammation (20 mins on, 20 mins off). Keep towel under ankle when resting to keep leg straight.     Keep CHERYL Dressing Clean, Dry and Intact. May shower with CHERYL Dressing. Please do not scrub, soak, peel or pick at the CHERYL dressing. No creams, lotions, powders, or oils over dressing. May shower with regular soap and water and let water run over dressing, no baths. Pat dry once out of shower. Dressing to be removed in 7 days. If dressing is saturated from border to border - may remove and replace with clean dry dressing.    Shower instructions for CHERYL Dressing: Turn battery pack off. Twist OFF battery pack before entering shower. Once done with showering. Pat dressing dry. Reconnect and twist ON battery pack after you are dry. Then turn battery pack on.     There are no staples or stitches that need to be removed.  If you notice any redness, irritation, or itching around the dressing call the surgeon's office.

## 2024-03-18 NOTE — PHYSICAL THERAPY INITIAL EVALUATION ADULT - ADDITIONAL COMMENTS
pt lives with wife in a private house with 2 entry steps equipped with bilateral hand rails that cannot be reached simultaneously.  Once inside, pt has to negotiate a flight of stairs, with 13 steps, right ascending handrail to access the bedroom and bathroom.  The bathroom has a stall shower, fixed / retractable shower head and comfort height toilet with adequate space to fit a commode over it.

## 2024-03-18 NOTE — DISCHARGE NOTE PROVIDER - NSDCFUADDAPPT_GEN_ALL_CORE_FT

## 2024-03-18 NOTE — DISCHARGE NOTE PROVIDER - NSDCFUSCHEDAPPT_GEN_ALL_CORE_FT
Paulino López  [facility]  [address]  Scheduled Appointment: 04/03/2024    Rebecca Shannon  [facility]  [facility] [address]  Scheduled Appointment: 06/11/2024

## 2024-03-18 NOTE — ASU PREOP CHECKLIST - NSSDAENDDT_GEN_ALL_CORE
Health Call Center    Phone Message    May a detailed message be left on voicemail: yes    Reason for Call: Order(s): Other:   Reason for requested: Requesting Orders for ultrasound guided steroid shot injection into Rt hip for pain - same as done in April - please put order into Epic so Pt daughter can schedule for same day as FU Appt with Dr Hall - Please call Daughter if needed - thanks  Date needed: ASAP so can be scheduled prior to Appt in November  Provider name: Dr Hall      Action Taken: Message routed to:  [facility]   18-Mar-2024

## 2024-03-19 ENCOUNTER — TRANSCRIPTION ENCOUNTER (OUTPATIENT)
Age: 59
End: 2024-03-19

## 2024-03-19 VITALS — SYSTOLIC BLOOD PRESSURE: 142 MMHG | DIASTOLIC BLOOD PRESSURE: 76 MMHG | HEART RATE: 86 BPM

## 2024-03-19 LAB
ANION GAP SERPL CALC-SCNC: 9 MMOL/L — SIGNIFICANT CHANGE UP (ref 5–17)
BUN SERPL-MCNC: 15 MG/DL — SIGNIFICANT CHANGE UP (ref 7–23)
CALCIUM SERPL-MCNC: 9.2 MG/DL — SIGNIFICANT CHANGE UP (ref 8.5–10.1)
CHLORIDE SERPL-SCNC: 105 MMOL/L — SIGNIFICANT CHANGE UP (ref 96–108)
CO2 SERPL-SCNC: 28 MMOL/L — SIGNIFICANT CHANGE UP (ref 22–31)
CREAT SERPL-MCNC: 0.77 MG/DL — SIGNIFICANT CHANGE UP (ref 0.5–1.3)
EGFR: 105 ML/MIN/1.73M2 — SIGNIFICANT CHANGE UP
GLUCOSE SERPL-MCNC: 174 MG/DL — HIGH (ref 70–99)
HCT VFR BLD CALC: 38.8 % — LOW (ref 39–50)
HGB BLD-MCNC: 13.6 G/DL — SIGNIFICANT CHANGE UP (ref 13–17)
MCHC RBC-ENTMCNC: 31.1 PG — SIGNIFICANT CHANGE UP (ref 27–34)
MCHC RBC-ENTMCNC: 35.1 G/DL — SIGNIFICANT CHANGE UP (ref 32–36)
MCV RBC AUTO: 88.8 FL — SIGNIFICANT CHANGE UP (ref 80–100)
NRBC # BLD: 0 /100 WBCS — SIGNIFICANT CHANGE UP (ref 0–0)
PLATELET # BLD AUTO: 149 K/UL — LOW (ref 150–400)
POTASSIUM SERPL-MCNC: 3.5 MMOL/L — SIGNIFICANT CHANGE UP (ref 3.5–5.3)
POTASSIUM SERPL-SCNC: 3.5 MMOL/L — SIGNIFICANT CHANGE UP (ref 3.5–5.3)
RBC # BLD: 4.37 M/UL — SIGNIFICANT CHANGE UP (ref 4.2–5.8)
RBC # FLD: 12.3 % — SIGNIFICANT CHANGE UP (ref 10.3–14.5)
SODIUM SERPL-SCNC: 142 MMOL/L — SIGNIFICANT CHANGE UP (ref 135–145)
WBC # BLD: 11.96 K/UL — HIGH (ref 3.8–10.5)
WBC # FLD AUTO: 11.96 K/UL — HIGH (ref 3.8–10.5)

## 2024-03-19 PROCEDURE — 73560 X-RAY EXAM OF KNEE 1 OR 2: CPT | Mod: 26,LT

## 2024-03-19 RX ORDER — CELECOXIB 200 MG/1
1 CAPSULE ORAL
Qty: 60 | Refills: 0
Start: 2024-03-19 | End: 2024-04-17

## 2024-03-19 RX ORDER — PANTOPRAZOLE SODIUM 20 MG/1
1 TABLET, DELAYED RELEASE ORAL
Qty: 30 | Refills: 0
Start: 2024-03-19 | End: 2024-04-17

## 2024-03-19 RX ORDER — OXYCODONE HYDROCHLORIDE 5 MG/1
1 TABLET ORAL
Qty: 42 | Refills: 0
Start: 2024-03-19 | End: 2024-03-25

## 2024-03-19 RX ORDER — ASCORBIC ACID 60 MG
1 TABLET,CHEWABLE ORAL
Qty: 0 | Refills: 0 | DISCHARGE
Start: 2024-03-19

## 2024-03-19 RX ORDER — SENNA PLUS 8.6 MG/1
2 TABLET ORAL
Qty: 0 | Refills: 0 | DISCHARGE
Start: 2024-03-19

## 2024-03-19 RX ORDER — POLYETHYLENE GLYCOL 3350 17 G/17G
17 POWDER, FOR SOLUTION ORAL
Qty: 0 | Refills: 0 | DISCHARGE
Start: 2024-03-19

## 2024-03-19 RX ORDER — ACETAMINOPHEN 500 MG
2 TABLET ORAL
Qty: 0 | Refills: 0 | DISCHARGE
Start: 2024-03-19

## 2024-03-19 RX ORDER — ASPIRIN/CALCIUM CARB/MAGNESIUM 324 MG
1 TABLET ORAL
Qty: 0 | Refills: 0 | DISCHARGE
Start: 2024-03-19

## 2024-03-19 RX ORDER — NALOXONE HYDROCHLORIDE 4 MG/.1ML
4 SPRAY NASAL
Qty: 1 | Refills: 0
Start: 2024-03-19 | End: 2024-03-19

## 2024-03-19 RX ORDER — ASPIRIN/CALCIUM CARB/MAGNESIUM 324 MG
1 TABLET ORAL
Refills: 0 | DISCHARGE

## 2024-03-19 RX ADMIN — OXYCODONE HYDROCHLORIDE 10 MILLIGRAM(S): 5 TABLET ORAL at 06:35

## 2024-03-19 RX ADMIN — PANTOPRAZOLE SODIUM 40 MILLIGRAM(S): 20 TABLET, DELAYED RELEASE ORAL at 06:06

## 2024-03-19 RX ADMIN — Medication 3: at 11:10

## 2024-03-19 RX ADMIN — Medication 650 MILLIGRAM(S): at 05:36

## 2024-03-19 RX ADMIN — OXYCODONE HYDROCHLORIDE 10 MILLIGRAM(S): 5 TABLET ORAL at 12:17

## 2024-03-19 RX ADMIN — Medication 650 MILLIGRAM(S): at 11:17

## 2024-03-19 RX ADMIN — OXYCODONE HYDROCHLORIDE 10 MILLIGRAM(S): 5 TABLET ORAL at 11:17

## 2024-03-19 RX ADMIN — Medication 500 MILLIGRAM(S): at 05:36

## 2024-03-19 RX ADMIN — Medication 1 TABLET(S): at 11:17

## 2024-03-19 RX ADMIN — Medication 100 MILLIGRAM(S): at 05:30

## 2024-03-19 RX ADMIN — Medication 650 MILLIGRAM(S): at 06:36

## 2024-03-19 RX ADMIN — CELECOXIB 200 MILLIGRAM(S): 200 CAPSULE ORAL at 06:36

## 2024-03-19 RX ADMIN — Medication 1000 MILLIGRAM(S): at 00:10

## 2024-03-19 RX ADMIN — METFORMIN HYDROCHLORIDE 1000 MILLIGRAM(S): 850 TABLET ORAL at 05:37

## 2024-03-19 RX ADMIN — HYDROMORPHONE HYDROCHLORIDE 0.5 MILLIGRAM(S): 2 INJECTION INTRAMUSCULAR; INTRAVENOUS; SUBCUTANEOUS at 00:10

## 2024-03-19 RX ADMIN — CELECOXIB 200 MILLIGRAM(S): 200 CAPSULE ORAL at 05:36

## 2024-03-19 RX ADMIN — OXYCODONE HYDROCHLORIDE 10 MILLIGRAM(S): 5 TABLET ORAL at 05:36

## 2024-03-19 RX ADMIN — Medication 102 MILLIGRAM(S): at 05:37

## 2024-03-19 RX ADMIN — LOSARTAN POTASSIUM 100 MILLIGRAM(S): 100 TABLET, FILM COATED ORAL at 05:36

## 2024-03-19 RX ADMIN — Medication 3: at 07:51

## 2024-03-19 RX ADMIN — Medication 81 MILLIGRAM(S): at 05:37

## 2024-03-19 RX ADMIN — Medication 650 MILLIGRAM(S): at 12:17

## 2024-03-19 NOTE — DISCHARGE NOTE NURSING/CASE MANAGEMENT/SOCIAL WORK - PATIENT PORTAL LINK FT
You can access the FollowMyHealth Patient Portal offered by [facility] by registering at the following website: [email or web address]. By joining "[employer]"’s FollowMyHealth portal, you will also be able to view your health information using other applications (apps) compatible with our system.

## 2024-03-19 NOTE — DISCHARGE NOTE NURSING/CASE MANAGEMENT/SOCIAL WORK - NSDCFUADDAPPT_GEN_ALL_CORE_FT

## 2024-03-19 NOTE — PROGRESS NOTE ADULT - SUBJECTIVE AND OBJECTIVE BOX
Patient is seen and examined at bedside. Denies CP/SOB/Dizziness/N/V/D/HA. Pain is controlled. +flatus, +BM.     Vital Signs Last 24 Hrs  T(C): 36.6 (19 Mar 2024 09:53), Max: 36.8 (18 Mar 2024 17:45)  T(F): 97.9 (19 Mar 2024 09:53), Max: 98.2 (18 Mar 2024 17:45)  HR: 91 (19 Mar 2024 09:53) (63 - 108)  BP: 145/78 (19 Mar 2024 09:53) (119/80 - 168/95)  BP(mean): --  RR: 17 (19 Mar 2024 09:53) (12 - 18)  SpO2: 96% (19 Mar 2024 09:53) (92% - 98%)    Parameters below as of 19 Mar 2024 09:53  Patient On (Oxygen Delivery Method): room air          PHYSICAL EXAM:  General: NAD  Neuro:  Alert & responsive  HEENT: NCAT, EOMI, conjunctiva clear  abd: soft, NT/ND  Right LE: Motor intact + EHL/FHL/TA/GS.  Sensation is grossly intact.  Extremity warm, compartments soft, compressible. No calf tenderness. DP 2+   Left LE: CHERYL dressing C/D/I. Battery flashing green/ok. Motor intact +EHL/FHL/TA/GS. Sensation is grossly intact. Extremity warm, compartments soft, compressible. No calf tenderness. DP2+    Labs:                          13.6   11.96 )-----------( 149      ( 19 Mar 2024 06:17 )             38.8       03-19    142  |  105  |  15  ----------------------------<  174<H>  3.5   |  28  |  0.77    Ca    9.2      19 Mar 2024 06:17        A/P: Patient is a 58y y/o Male s/p left TKA, POD # 1  -wound care, knee extension/leg elevation, cryocuff, isometric exercises, new medications reviewed with pt  -Pain control/analgesia reviewed   -Inc spirometry reviewed with pt, demonstrated competence  -DVT prophylaxis with Venodynes/Aspirin 81mg BID   -PT/OT/WBAT  -medical consult reviewed   -DC planning: for home today with home care  -Pt seen in am with DR López

## 2024-03-19 NOTE — DISCHARGE NOTE NURSING/CASE MANAGEMENT/SOCIAL WORK - NSDCPEFALRISK_GEN_ALL_CORE
For information on Fall & Injury Prevention, visit: [email or web address] OR  [email or web address] OR  https://www.cdc.gov/steadi/patient.html

## 2024-03-19 NOTE — PROGRESS NOTE ADULT - SUBJECTIVE AND OBJECTIVE BOX
YOANA LEON is a 58y Male s/p LEFT TOTTAL KNEE ARTHROPLASTY WITH MARLEY ROBOTIC ASSIST        denies any chest pain shortness of breath palpitation dizziness lightheadedness nausea vomiting fever or chills    T(C): 36.6 (03-19-24 @ 09:53), Max: 36.8 (03-18-24 @ 17:45)  HR: 91 (03-19-24 @ 09:53) (63 - 108)  BP: 145/78 (03-19-24 @ 09:53) (119/80 - 168/95)  RR: 17 (03-19-24 @ 09:53) (12 - 18)  SpO2: 96% (03-19-24 @ 09:53) (92% - 98%)  no jvd/bruit  s1 s2 rrr  cta  s/nt/nd  no calf tend                        13.6   11.96 )-----------( 149      ( 19 Mar 2024 06:17 )             38.8   03-19    142  |  105  |  15  ----------------------------<  174<H>  3.5   |  28  |  0.77    Ca    9.2      19 Mar 2024 06:17        cont dvt px  pain control  bowel regimen  antiemetics  incentive spirometer

## 2024-03-25 DIAGNOSIS — E11.9 TYPE 2 DIABETES MELLITUS WITHOUT COMPLICATIONS: ICD-10-CM

## 2024-03-25 DIAGNOSIS — Z79.84 LONG TERM (CURRENT) USE OF ORAL HYPOGLYCEMIC DRUGS: ICD-10-CM

## 2024-03-25 DIAGNOSIS — E78.5 HYPERLIPIDEMIA, UNSPECIFIED: ICD-10-CM

## 2024-03-25 DIAGNOSIS — M17.12 UNILATERAL PRIMARY OSTEOARTHRITIS, LEFT KNEE: ICD-10-CM

## 2024-03-25 DIAGNOSIS — Z79.82 LONG TERM (CURRENT) USE OF ASPIRIN: ICD-10-CM

## 2024-03-25 DIAGNOSIS — I10 ESSENTIAL (PRIMARY) HYPERTENSION: ICD-10-CM

## 2024-04-03 ENCOUNTER — APPOINTMENT (OUTPATIENT)
Dept: ORTHOPEDIC SURGERY | Facility: CLINIC | Age: 59
End: 2024-04-03
Payer: COMMERCIAL

## 2024-04-03 VITALS — BODY MASS INDEX: 32.58 KG/M2 | WEIGHT: 220 LBS | HEIGHT: 69 IN

## 2024-04-03 PROBLEM — E11.9 TYPE 2 DIABETES MELLITUS WITHOUT COMPLICATIONS: Chronic | Status: ACTIVE | Noted: 2024-03-05

## 2024-04-03 PROBLEM — E78.5 HYPERLIPIDEMIA, UNSPECIFIED: Chronic | Status: ACTIVE | Noted: 2024-03-05

## 2024-04-03 PROBLEM — I10 ESSENTIAL (PRIMARY) HYPERTENSION: Chronic | Status: ACTIVE | Noted: 2024-03-05

## 2024-04-03 PROCEDURE — 73562 X-RAY EXAM OF KNEE 3: CPT | Mod: LT

## 2024-04-03 PROCEDURE — 99024 POSTOP FOLLOW-UP VISIT: CPT

## 2024-04-03 RX ORDER — OXYCODONE 5 MG/1
5 TABLET ORAL
Qty: 42 | Refills: 0 | Status: ACTIVE | COMMUNITY
Start: 2024-04-03 | End: 1900-01-01

## 2024-04-03 NOTE — ASSESSMENT
[FreeTextEntry1] : 58M 2 weeks s/p L TKA  Begin outpt PT progress activities as tolerated return 4 weeks   We discussed the patient's progress and they were reminded of their antibiotic prophylaxis. We discussed continued physical therapy and/or a home exercise program. Questions about their knee replacement and future follow up were answered and discussed.

## 2024-04-03 NOTE — PHYSICAL EXAM
[Left] : left knee [] : ambulation with cane [There are no fractures, subluxations or dislocations. No significant abnormalities are seen] : There are no fractures, subluxations or dislocations. No significant abnormalities are seen [No loss of surgical correlation. Bony alignment acceptable. Hardware in appropriate position] : No loss of surgical correlation. Bony alignment acceptable. Hardware in appropriate position [TWNoteComboBox7] : flexion 105 degrees [de-identified] : extension 3 degrees

## 2024-04-03 NOTE — HISTORY OF PRESENT ILLNESS
[Gradual] : gradual [4] : 4 [0] : 0 [Localized] : localized [Full time] : Work status: full time [de-identified] : 1/18/24: 57yo M with longstanding left knee pain that has been gradually worsening over the past few months with no injury. Admits to increasing pain and difficulty with prolonged walking/standing, startup, and stairs. Hx of L knee arthroscopy ~30yrs ago. He has tried CSI ~7+ years ago that provided mild relief. Notes he cannot straighten his leg.   4/3/24: pt is here today for PO#1 of lt knee. pt states the knee is doing great. no issues to report. improving steadily [] : no [FreeTextEntry1] : l knee [FreeTextEntry5] : chronic knee pain, has had sx in the past. Pain getting worse and hurts more at night. Would like to discuss TKA. [FreeTextEntry9] : CBD ointment on occasion [de-identified] : worse at night with constant throbbing  [de-identified] : 3 diiferent drs in the past [de-identified] : in the past [de-identified] : in the past

## 2024-04-17 ENCOUNTER — APPOINTMENT (OUTPATIENT)
Dept: INTERNAL MEDICINE | Facility: CLINIC | Age: 59
End: 2024-04-17
Payer: COMMERCIAL

## 2024-04-17 VITALS
BODY MASS INDEX: 32.58 KG/M2 | SYSTOLIC BLOOD PRESSURE: 152 MMHG | OXYGEN SATURATION: 96 % | HEIGHT: 69 IN | TEMPERATURE: 97.7 F | HEART RATE: 95 BPM | DIASTOLIC BLOOD PRESSURE: 93 MMHG | WEIGHT: 220 LBS

## 2024-04-17 VITALS — SYSTOLIC BLOOD PRESSURE: 140 MMHG | DIASTOLIC BLOOD PRESSURE: 95 MMHG

## 2024-04-17 DIAGNOSIS — Z01.818 ENCOUNTER FOR OTHER PREPROCEDURAL EXAMINATION: ICD-10-CM

## 2024-04-17 PROCEDURE — 99214 OFFICE O/P EST MOD 30 MIN: CPT

## 2024-04-17 PROCEDURE — 36415 COLL VENOUS BLD VENIPUNCTURE: CPT

## 2024-04-17 RX ORDER — OXYCODONE 5 MG/1
5 TABLET ORAL EVERY 4 HOURS
Qty: 42 | Refills: 0 | Status: DISCONTINUED | COMMUNITY
Start: 2024-03-26 | End: 2024-04-17

## 2024-04-18 LAB
ALBUMIN SERPL ELPH-MCNC: 4.8 G/DL
ALP BLD-CCNC: 128 U/L
ALT SERPL-CCNC: 73 U/L
ANION GAP SERPL CALC-SCNC: 15 MMOL/L
AST SERPL-CCNC: 48 U/L
BILIRUB SERPL-MCNC: 0.6 MG/DL
BUN SERPL-MCNC: 15 MG/DL
CALCIUM SERPL-MCNC: 9.8 MG/DL
CHLORIDE SERPL-SCNC: 100 MMOL/L
CO2 SERPL-SCNC: 24 MMOL/L
CREAT SERPL-MCNC: 0.86 MG/DL
EGFR: 102 ML/MIN/1.73M2
ESTIMATED AVERAGE GLUCOSE: 160 MG/DL
GLUCOSE SERPL-MCNC: 183 MG/DL
HBA1C MFR BLD HPLC: 7.2 %
POTASSIUM SERPL-SCNC: 3.6 MMOL/L
PROT SERPL-MCNC: 7.4 G/DL
SODIUM SERPL-SCNC: 139 MMOL/L

## 2024-04-18 NOTE — ADDENDUM
[FreeTextEntry1] : persistently elevated LFTs slightly improved from prior. Recommendations as discussed in office yesterday with regards to gastroenterology follow-up and additional evaluation with more specific imaging such as a FibroScan  A1c 7.2 down from 7.7 Increase Ozempic up to 0.5 mg q. weekly as discussed in office  I will see him back in 3 months sooner if needed

## 2024-04-18 NOTE — HISTORY OF PRESENT ILLNESS
[FreeTextEntry1] : uncontrolled diabetes with an A1c of 7.7 and transaminitis [de-identified] : -PMH: T2DM, HTN, HLD, Transaminitis 2/2 Hepatic Steatosis -SH: . 2 children. Account . >4 EtOH Beverages/day. Non-Smoker.  YOANA is a 58 year M whom is here today for follow-up uncontrolled HTN, uncontrolled DM diabetes with an A1c of 7.7 and transaminitis Since time of last visit patient has started on Ozempic Today, pt reports feeling well  Denies abdominal pain, constipation, diarrhea, nausea, vomiting  -T2DM: Now on Ozempic 0.25mg Qwk. Remains on Jardiance 25mg QD & Metformin 1000mg BID. On ASA & Statin. Non-Compliant w/ BG Monitoring. (12/2023) A1c 7.7. (5/2023) Optho Eval: No Retinopathy. No reported changes.  -HTN: Remains on HCTZ 25mg & Losartan 100mg. No reported changes. -HLD: Remains on Atorvastatin 40mg QHS. No reported changes.  -Hepatic Steatosis: (2018) CT Abd demonstrated diffuse steatosis. He admits to binge episodes of EtOH. Not interested in cutting back. increased in LFTs, pt was therefore referred to GI still not consulted with

## 2024-04-18 NOTE — ASSESSMENT
[FreeTextEntry1] : -PMH: T2DM, HTN, HLD, Transaminitis 2/2 Hepatic Steatosis -SH: . 2 children. Account . >4 EtOH Beverages/day. Non-Smoker.  YOANA is a 58 year M whom is here today for CPE  Specialists Involved: -Optho: Dr. Moran ([phone])  -F/u labs drawn in office today -Further recs pending lab results -Still needs to f/u w/ GI (Colonoscopy & Elevated LFTs) -Continue annual f/u w/ Optho (Dilated diabetic eye exam) -RTO 3mo for routine f/u or sooner if needed

## 2024-04-19 ENCOUNTER — NON-APPOINTMENT (OUTPATIENT)
Age: 59
End: 2024-04-19

## 2024-04-24 ENCOUNTER — NON-APPOINTMENT (OUTPATIENT)
Age: 59
End: 2024-04-24

## 2024-05-16 ENCOUNTER — APPOINTMENT (OUTPATIENT)
Dept: ORTHOPEDIC SURGERY | Facility: CLINIC | Age: 59
End: 2024-05-16
Payer: COMMERCIAL

## 2024-05-16 VITALS — HEIGHT: 69 IN | WEIGHT: 220 LBS | BODY MASS INDEX: 32.58 KG/M2

## 2024-05-16 DIAGNOSIS — Z96.652 PRESENCE OF LEFT ARTIFICIAL KNEE JOINT: ICD-10-CM

## 2024-05-16 PROCEDURE — 99024 POSTOP FOLLOW-UP VISIT: CPT

## 2024-05-16 NOTE — PHYSICAL EXAM
[Left] : left knee [There are no fractures, subluxations or dislocations. No significant abnormalities are seen] : There are no fractures, subluxations or dislocations. No significant abnormalities are seen [No loss of surgical correlation. Bony alignment acceptable. Hardware in appropriate position] : No loss of surgical correlation. Bony alignment acceptable. Hardware in appropriate position [] : no tenderness [TWNoteComboBox7] : flexion 120 degrees [de-identified] : extension 3 degrees

## 2024-05-16 NOTE — HISTORY OF PRESENT ILLNESS
[10] : 10 [Localized] : localized [Sharp] : sharp [Frequent] : frequent [Rest] : rest [Physical therapy] : physical therapy [Lying in bed] : lying in bed [Full time] : Work status: full time [de-identified] : 1/18/24: 57yo M with longstanding left knee pain that has been gradually worsening over the past few months with no injury. Admits to increasing pain and difficulty with prolonged walking/standing, startup, and stairs. Hx of L knee arthroscopy ~30yrs ago. He has tried CSI ~7+ years ago that provided mild relief. Notes he cannot straighten his leg.   4/3/24: pt is here today for PO#1 of lt knee. pt states the knee is doing great. no issues to report. improving steadily 5/16/24: 8 weeks s/p L TKA, doing well, minimal pain, back to [employer] [] : no [FreeTextEntry1] : left knee [de-identified] : twisting  [de-identified] : 3/18/24 [de-identified] : PT  [de-identified] : 3/18/24

## 2024-05-16 NOTE — ASSESSMENT
[FreeTextEntry1] : 58M 8 weeks s/p L TKA  Continue HEP Activities as tolerated return 10 weeks   We discussed the patient's progress and they were reminded of their antibiotic prophylaxis. We discussed continued physical therapy and/or a home exercise program. Questions about their knee replacement and future follow up were answered and discussed.

## 2024-06-11 ENCOUNTER — APPOINTMENT (OUTPATIENT)
Dept: INTERNAL MEDICINE | Facility: CLINIC | Age: 59
End: 2024-06-11
Payer: COMMERCIAL

## 2024-06-11 VITALS — DIASTOLIC BLOOD PRESSURE: 85 MMHG | SYSTOLIC BLOOD PRESSURE: 128 MMHG

## 2024-06-11 VITALS
BODY MASS INDEX: 31.84 KG/M2 | WEIGHT: 215 LBS | SYSTOLIC BLOOD PRESSURE: 110 MMHG | HEART RATE: 73 BPM | RESPIRATION RATE: 16 BRPM | TEMPERATURE: 97.1 F | HEIGHT: 69 IN | DIASTOLIC BLOOD PRESSURE: 80 MMHG | OXYGEN SATURATION: 98 %

## 2024-06-11 DIAGNOSIS — K76.0 FATTY (CHANGE OF) LIVER, NOT ELSEWHERE CLASSIFIED: ICD-10-CM

## 2024-06-11 DIAGNOSIS — E11.9 TYPE 2 DIABETES MELLITUS W/OUT COMPLICATIONS: ICD-10-CM

## 2024-06-11 DIAGNOSIS — E78.5 TYPE 2 DIABETES MELLITUS WITH OTHER SPECIFIED COMPLICATION: ICD-10-CM

## 2024-06-11 DIAGNOSIS — E11.69 TYPE 2 DIABETES MELLITUS WITH OTHER SPECIFIED COMPLICATION: ICD-10-CM

## 2024-06-11 DIAGNOSIS — M54.50 LOW BACK PAIN, UNSPECIFIED: ICD-10-CM

## 2024-06-11 DIAGNOSIS — I10 ESSENTIAL (PRIMARY) HYPERTENSION: ICD-10-CM

## 2024-06-11 DIAGNOSIS — E66.9 OBESITY, UNSPECIFIED: ICD-10-CM

## 2024-06-11 PROCEDURE — 36415 COLL VENOUS BLD VENIPUNCTURE: CPT

## 2024-06-11 PROCEDURE — 99214 OFFICE O/P EST MOD 30 MIN: CPT

## 2024-06-11 PROCEDURE — G2211 COMPLEX E/M VISIT ADD ON: CPT

## 2024-06-11 RX ORDER — PREDNISONE 10 MG/1
10 TABLET ORAL
Qty: 26 | Refills: 0 | Status: DISCONTINUED | COMMUNITY
Start: 2020-07-24 | End: 2024-06-11

## 2024-06-11 RX ORDER — SEMAGLUTIDE 0.68 MG/ML
2 INJECTION, SOLUTION SUBCUTANEOUS
Qty: 2 | Refills: 1 | Status: ACTIVE | COMMUNITY
Start: 2024-02-23 | End: 1900-01-01

## 2024-06-11 RX ORDER — CYCLOBENZAPRINE HYDROCHLORIDE 10 MG/1
10 TABLET, FILM COATED ORAL
Qty: 30 | Refills: 0 | Status: ACTIVE | COMMUNITY
Start: 2024-06-11 | End: 1900-01-01

## 2024-06-14 LAB
ALBUMIN SERPL ELPH-MCNC: 4.5 G/DL
ALP BLD-CCNC: 99 U/L
ALT SERPL-CCNC: 72 U/L
ANION GAP SERPL CALC-SCNC: 12 MMOL/L
AST SERPL-CCNC: 53 U/L
BILIRUB SERPL-MCNC: 0.6 MG/DL
BUN SERPL-MCNC: 19 MG/DL
CALCIUM SERPL-MCNC: 9.7 MG/DL
CHLORIDE SERPL-SCNC: 102 MMOL/L
CHOLEST SERPL-MCNC: 143 MG/DL
CO2 SERPL-SCNC: 27 MMOL/L
CREAT SERPL-MCNC: 0.9 MG/DL
EGFR: 100 ML/MIN/1.73M2
ESTIMATED AVERAGE GLUCOSE: 146 MG/DL
GLUCOSE SERPL-MCNC: 134 MG/DL
HBA1C MFR BLD HPLC: 6.7 %
HDLC SERPL-MCNC: 45 MG/DL
LDLC SERPL CALC-MCNC: 77 MG/DL
NONHDLC SERPL-MCNC: 98 MG/DL
POTASSIUM SERPL-SCNC: 3.9 MMOL/L
PROT SERPL-MCNC: 7.4 G/DL
SODIUM SERPL-SCNC: 141 MMOL/L
TRIGL SERPL-MCNC: 112 MG/DL
VIT B12 SERPL-MCNC: 426 PG/ML

## 2024-06-14 NOTE — ADDENDUM
[FreeTextEntry1] : Labs all good aside the followin.  Persistently elevated LFTs unchanged from prior.  Gastroenterology/hepatology consultation again strongly advised  2.  A1c 6.7 down from 7.2 Recommend he stay on current regimen and return to office in 3 months

## 2024-06-14 NOTE — ASSESSMENT
[FreeTextEntry1] : -PMH: T2DM, HTN, HLD, Transaminitis 2/2 Hepatic Steatosis -SH: . 2 children. Account . >4 EtOH Beverages/day. Non-Smoker.  YOANA is a 58 year M whom is here today for CPE  Specialists Involved: -Optho: Dr. Moran ([phone])  -F/u labs drawn in office today -Further recs pending lab results -Still needs to f/u w/ GI (Colonoscopy & Elevated LFTs) -Continue annual f/u w/ Optho (Dilated diabetic eye exam) -RTO 3mo for routine f/u or sooner if needed

## 2024-06-14 NOTE — HISTORY OF PRESENT ILLNESS
[FreeTextEntry1] : uncontrolled diabetes with an A1c of 7.2 and transaminitis [de-identified] : -PMH: T2DM, HTN, HLD, Transaminitis 2/2 Hepatic Steatosis -SH: . 2 children. Account . >4 EtOH Beverages/day. Non-Smoker.  YOANA is a 58 year M whom is here today for follow-up uncontrolled HTN, uncontrolled DM diabetes with an A1c of 7.2 and transaminitis Today, pt reports feeling well   -T2DM: Now on Ozempic 0.5mg Qwk. Remains on Jardiance 25mg QD & Metformin 1000mg BID. On ASA & Statin. Non-Compliant w/ BG Monitoring. (4/2024) A1c 7.2. (5/2023) Optho Eval: No Retinopathy.  -HTN: Remains on HCTZ 25mg & Losartan 100mg.  -HLD: Remains on Atorvastatin 40mg QHS.  -Hepatic Steatosis: (2018) CT Abd demonstrated diffuse steatosis. He admits to binge episodes of EtOH. Not interested in cutting back. increased in LFTs, pt was therefore referred to GI still not consulted with

## 2024-07-24 ENCOUNTER — TRANSCRIPTION ENCOUNTER (OUTPATIENT)
Age: 59
End: 2024-07-24

## 2024-08-08 ENCOUNTER — APPOINTMENT (OUTPATIENT)
Dept: ORTHOPEDIC SURGERY | Facility: CLINIC | Age: 59
End: 2024-08-08

## 2024-08-08 PROCEDURE — 99212 OFFICE O/P EST SF 10 MIN: CPT

## 2024-08-08 PROCEDURE — 73562 X-RAY EXAM OF KNEE 3: CPT | Mod: LT

## 2024-08-08 NOTE — HISTORY OF PRESENT ILLNESS
[0] : 0 [Full time] : Work status: full time [de-identified] : 1/18/24: 59yo M with longstanding left knee pain that has been gradually worsening over the past few months with no injury. Admits to increasing pain and difficulty with prolonged walking/standing, startup, and stairs. Hx of L knee arthroscopy ~30yrs ago. He has tried CSI ~7+ years ago that provided mild relief. Notes he cannot straighten his leg.   4/3/24: pt is here today for PO#1 of lt knee. pt states the knee is doing great. no issues to report. improving steadily 5/16/24: 8 weeks s/p L TKA, doing well, minimal pain, back to golfing 8/8/24 4 mo s/p L TKA, minimal pain, still some swelling [de-identified] : none [de-identified] :

## 2024-08-08 NOTE — PHYSICAL EXAM
[] : ambulation with cane [Left] : left knee [There are no fractures, subluxations or dislocations. No significant abnormalities are seen] : There are no fractures, subluxations or dislocations. No significant abnormalities are seen [No loss of surgical correlation. Bony alignment acceptable. Hardware in appropriate position] : No loss of surgical correlation. Bony alignment acceptable. Hardware in appropriate position [TWNoteComboBox7] : flexion 120 degrees [de-identified] : extension 3 degrees

## 2024-08-08 NOTE — REASON FOR VISIT
Bedside and Verbal shift change report given to [facility] (oncoming nurse) by self Lisa tomas). Report included the following information SBAR, Kardex and MAR. PT having chest pain 8 out of 10 during shift change. 1 SL nitro given and nitro paste moved to left chest. Pt stated relief of pain. Ekg ordered per chest pain protocol. Pt resting in bed, call light in reach. Pt states he has episodes of this pain at home a few times a week, typically in the evenings after eating. Pain reproducible on exam when I pressed on patient's chest.     Heparin gtt verified with oncoming RN. Oncoming RN assuming care. [FreeTextEntry2] : f/u lt knee

## 2024-08-23 ENCOUNTER — RX RENEWAL (OUTPATIENT)
Age: 59
End: 2024-08-23

## 2024-08-30 ENCOUNTER — TRANSCRIPTION ENCOUNTER (OUTPATIENT)
Age: 59
End: 2024-08-30

## 2024-10-02 ENCOUNTER — RX RENEWAL (OUTPATIENT)
Age: 59
End: 2024-10-02

## 2024-10-03 ENCOUNTER — RX RENEWAL (OUTPATIENT)
Age: 59
End: 2024-10-03

## 2024-10-10 ENCOUNTER — APPOINTMENT (OUTPATIENT)
Dept: INTERNAL MEDICINE | Facility: CLINIC | Age: 59
End: 2024-10-10
Payer: COMMERCIAL

## 2024-10-10 VITALS
HEART RATE: 90 BPM | RESPIRATION RATE: 16 BRPM | BODY MASS INDEX: 32.88 KG/M2 | WEIGHT: 222 LBS | SYSTOLIC BLOOD PRESSURE: 130 MMHG | DIASTOLIC BLOOD PRESSURE: 80 MMHG | OXYGEN SATURATION: 98 % | TEMPERATURE: 97.2 F | HEIGHT: 69 IN

## 2024-10-10 DIAGNOSIS — E11.69 TYPE 2 DIABETES MELLITUS WITH OTHER SPECIFIED COMPLICATION: ICD-10-CM

## 2024-10-10 DIAGNOSIS — E11.9 TYPE 2 DIABETES MELLITUS W/OUT COMPLICATIONS: ICD-10-CM

## 2024-10-10 DIAGNOSIS — E78.5 TYPE 2 DIABETES MELLITUS WITH OTHER SPECIFIED COMPLICATION: ICD-10-CM

## 2024-10-10 DIAGNOSIS — R74.01 ELEVATION OF LEVELS OF LIVER TRANSAMINASE LEVELS: ICD-10-CM

## 2024-10-10 DIAGNOSIS — E66.9 OBESITY, UNSPECIFIED: ICD-10-CM

## 2024-10-10 DIAGNOSIS — I10 ESSENTIAL (PRIMARY) HYPERTENSION: ICD-10-CM

## 2024-10-10 PROCEDURE — G2211 COMPLEX E/M VISIT ADD ON: CPT | Mod: NC

## 2024-10-10 PROCEDURE — 99214 OFFICE O/P EST MOD 30 MIN: CPT

## 2024-10-10 PROCEDURE — 36415 COLL VENOUS BLD VENIPUNCTURE: CPT

## 2024-10-11 LAB
ALBUMIN SERPL ELPH-MCNC: 4.6 G/DL
ALP BLD-CCNC: 93 U/L
ALT SERPL-CCNC: 117 U/L
ANION GAP SERPL CALC-SCNC: 15 MMOL/L
AST SERPL-CCNC: 63 U/L
BILIRUB SERPL-MCNC: 0.5 MG/DL
BUN SERPL-MCNC: 15 MG/DL
CALCIUM SERPL-MCNC: 9.6 MG/DL
CHLORIDE SERPL-SCNC: 100 MMOL/L
CO2 SERPL-SCNC: 24 MMOL/L
CREAT SERPL-MCNC: 0.83 MG/DL
CREAT SPEC-SCNC: 71 MG/DL
EGFR: 102 ML/MIN/1.73M2
ESTIMATED AVERAGE GLUCOSE: 134 MG/DL
GLUCOSE SERPL-MCNC: 127 MG/DL
HBA1C MFR BLD HPLC: 6.3 %
MICROALBUMIN 24H UR DL<=1MG/L-MCNC: <1.2 MG/DL
MICROALBUMIN/CREAT 24H UR-RTO: NORMAL MG/G
POTASSIUM SERPL-SCNC: 3.7 MMOL/L
PROT SERPL-MCNC: 7.4 G/DL
SODIUM SERPL-SCNC: 138 MMOL/L

## 2024-10-16 NOTE — OCCUPATIONAL THERAPY INITIAL EVALUATION ADULT - LEVEL OF INDEPENDENCE: SUPINE/SIT, REHAB EVAL
Adrienne Gibson is here today for Office Visit (Sore throat / runny nose / bilateral ear pain more or so the R ear / body aches and chills / )  Patient has has syomtoms x2 days, partner recently had pneumonia.         Concerns/symptoms:     Medications: medications verified, no change  Refills needed today? No     Tobacco history: verified     Advanced Directives: No not on file, not interested.    BP >140/90? No    Care Teams Updated? No    Patient Preference for result Communication via: [employer]    Cell Phone:   Telephone Information:   Mobile [phone]     Okay to leave a message containing results? Yes    {Tip! Place Orders  Reconcile Immunizations  Add External Results  Tip sections disappear when note is signed so you do not need to manually delete.  :3}   Health Maintenance       HPV Vaccine (2 - 3-dose series)  Overdue since 1/12/2023    Influenza Vaccine (1)  Overdue since 9/1/2024    COVID-19 Vaccine (3 - 2023-24 season)  Overdue since 9/1/2024           Following review of the above:  Patient is not proceeding with: COVID-19, HPV, and Influenza    Note: Refer to final orders and clinician documentation.    {Tip! Click here to provide feedback:3}      Immunization History   Administered Date(s) Administered    COVID Pfizer 12Y+ (Requires Dilution) 03/19/2021, 04/16/2021    DPT HIB 03/11/1997, 05/13/1997    DTaP 03/11/1997, 05/13/1997, 07/08/1997, 07/08/1997, 01/13/1998, 01/13/1998, 08/20/2002, 08/20/2002    HIB, Unspecified Formulation 03/11/1997, 05/13/1997, 07/08/1997, 01/13/1998    HPV 9-Valent 12/15/2022    Hep B, adolescent or pediatric 1996, 03/11/1997, 07/08/1997    IPV 03/11/1997, 05/13/1997, 01/13/1998, 08/20/2002    Influenza, split virus, quadrivalent 12/19/2014, 10/26/2015    Influenza, split virus, quadrivalent, PF 12/15/2022    Influenza, split virus, trivalent 10/01/2008    MMR 01/13/1998, 08/20/2002, 05/03/2015    Polio, Oral 03/11/1997, 05/13/1997, 01/13/1998    Tdap  10/01/2008, 03/25/2015, 06/27/2024   Deferred Date(s) Deferred    Tdap 07/13/2024    Varicella 07/13/2024         PHQ 2:  PHQ 2 Score Adult PHQ 2 Score Adult PHQ 2 Interpretation Little interest or pleasure in activity?   8/27/2024   2:37 PM 4 Further screening needed 2       PHQ 9:  PHQ 9 Score Adult PHQ 9 Score Adult PHQ 9 Interpretation   8/27/2024   2:37 PM 14 Moderate Depression          supervision

## 2024-11-08 ENCOUNTER — RX RENEWAL (OUTPATIENT)
Age: 59
End: 2024-11-08

## 2024-12-18 NOTE — DISCHARGE NOTE PROVIDER - NSTOBACCOUSAGEY/N_GEN_A_CS
malaise/fatigue.   Cardiovascular:  Negative for chest pain.   Respiratory:  Negative for shortness of breath.    Musculoskeletal:  Positive for arthritis and back pain.   Neurological:  Negative for focal weakness.   Psychiatric/Behavioral:  Negative for depression.            PHYSICAL EXAM:  Wt Readings from Last 3 Encounters:   12/18/24 135.2 kg (298 lb)   08/09/24 (!) 136.5 kg (301 lb)   06/17/24 (!) 136.5 kg (301 lb)     BP Readings from Last 3 Encounters:   12/18/24 118/62   08/09/24 118/78   06/17/24 122/60     Pulse Readings from Last 3 Encounters:   12/18/24 68   06/17/24 64   12/06/23 72       Physical Exam  Constitutional:       General: He is not in acute distress.  Neck:      Vascular: No carotid bruit.   Cardiovascular:      Rate and Rhythm: Normal rate and regular rhythm.   Pulmonary:      Effort: No respiratory distress.      Breath sounds: Normal breath sounds.   Abdominal:      General: Bowel sounds are normal.      Palpations: Abdomen is soft.   Musculoskeletal:         General: No swelling.   Skin:     General: Skin is warm and dry.   Neurological:      General: No focal deficit present.   Psychiatric:         Mood and Affect: Mood normal.         Medical problems and test results were reviewed with the patient today.       Lab Results   Component Value Date    WBC 7.1 04/06/2021    HGB 14.3 04/06/2021    HCT 43.5 04/06/2021    MCV 90.1 04/06/2021     04/06/2021       Lab Results   Component Value Date/Time     04/06/2021 03:19 AM    K 4.0 04/06/2021 03:19 AM     04/06/2021 03:19 AM    CO2 26 04/06/2021 03:19 AM    BUN 14 04/06/2021 03:19 AM    CREATININE 0.89 04/06/2021 03:19 AM    GLUCOSE 99 04/06/2021 03:19 AM    CALCIUM 8.9 04/06/2021 03:19 AM        Lab Results   Component Value Date    CHOL 84 04/06/2021     Lab Results   Component Value Date    TRIG 187 (H) 04/06/2021     Lab Results   Component Value Date    HDL 29 (L) 04/06/2021     No components found for:  No

## 2025-01-13 ENCOUNTER — RX RENEWAL (OUTPATIENT)
Age: 60
End: 2025-01-13

## 2025-01-18 ENCOUNTER — NON-APPOINTMENT (OUTPATIENT)
Age: 60
End: 2025-01-18

## 2025-02-03 ENCOUNTER — APPOINTMENT (OUTPATIENT)
Dept: INTERNAL MEDICINE | Facility: CLINIC | Age: 60
End: 2025-02-03

## 2025-02-12 NOTE — ASU PREOP CHECKLIST - BMI (KG/M2)
Patient mobility status  with no difficulty.     I have reviewed discharge instructions with the patient and parent.  The patient and parent verbalized understanding.    Patient left ED via Discharge Method: ambulatory to Home with Parent.    Opportunity for questions and clarification provided.     Patient given 2 escripts.        
32.8

## 2025-02-13 ENCOUNTER — APPOINTMENT (OUTPATIENT)
Dept: ORTHOPEDIC SURGERY | Facility: CLINIC | Age: 60
End: 2025-02-13
Payer: COMMERCIAL

## 2025-02-13 VITALS — BODY MASS INDEX: 32.88 KG/M2 | HEIGHT: 69 IN | WEIGHT: 222 LBS

## 2025-02-13 DIAGNOSIS — Z96.652 PRESENCE OF LEFT ARTIFICIAL KNEE JOINT: ICD-10-CM

## 2025-02-13 PROCEDURE — 99213 OFFICE O/P EST LOW 20 MIN: CPT

## 2025-02-13 PROCEDURE — 73562 X-RAY EXAM OF KNEE 3: CPT | Mod: LT

## 2025-02-27 ENCOUNTER — APPOINTMENT (OUTPATIENT)
Dept: INTERNAL MEDICINE | Facility: CLINIC | Age: 60
End: 2025-02-27
Payer: COMMERCIAL

## 2025-02-27 ENCOUNTER — NON-APPOINTMENT (OUTPATIENT)
Age: 60
End: 2025-02-27

## 2025-02-27 VITALS
DIASTOLIC BLOOD PRESSURE: 88 MMHG | BODY MASS INDEX: 32.73 KG/M2 | WEIGHT: 221 LBS | SYSTOLIC BLOOD PRESSURE: 130 MMHG | RESPIRATION RATE: 16 BRPM | HEIGHT: 69 IN | OXYGEN SATURATION: 98 % | HEART RATE: 85 BPM | TEMPERATURE: 97.7 F

## 2025-02-27 DIAGNOSIS — E11.69 TYPE 2 DIABETES MELLITUS WITH OTHER SPECIFIED COMPLICATION: ICD-10-CM

## 2025-02-27 DIAGNOSIS — E11.9 TYPE 2 DIABETES MELLITUS W/OUT COMPLICATIONS: ICD-10-CM

## 2025-02-27 DIAGNOSIS — Z12.5 ENCOUNTER FOR SCREENING FOR MALIGNANT NEOPLASM OF PROSTATE: ICD-10-CM

## 2025-02-27 DIAGNOSIS — R35.0 FREQUENCY OF MICTURITION: ICD-10-CM

## 2025-02-27 DIAGNOSIS — E66.9 OBESITY, UNSPECIFIED: ICD-10-CM

## 2025-02-27 DIAGNOSIS — Z00.00 ENCOUNTER FOR GENERAL ADULT MEDICAL EXAMINATION W/OUT ABNORMAL FINDINGS: ICD-10-CM

## 2025-02-27 DIAGNOSIS — K76.0 FATTY (CHANGE OF) LIVER, NOT ELSEWHERE CLASSIFIED: ICD-10-CM

## 2025-02-27 DIAGNOSIS — E78.5 TYPE 2 DIABETES MELLITUS WITH OTHER SPECIFIED COMPLICATION: ICD-10-CM

## 2025-02-27 DIAGNOSIS — Z13.6 ENCOUNTER FOR SCREENING FOR CARDIOVASCULAR DISORDERS: ICD-10-CM

## 2025-02-27 DIAGNOSIS — J30.0 VASOMOTOR RHINITIS: ICD-10-CM

## 2025-02-27 DIAGNOSIS — R74.01 ELEVATION OF LEVELS OF LIVER TRANSAMINASE LEVELS: ICD-10-CM

## 2025-02-27 DIAGNOSIS — I10 ESSENTIAL (PRIMARY) HYPERTENSION: ICD-10-CM

## 2025-02-27 PROCEDURE — 93000 ELECTROCARDIOGRAM COMPLETE: CPT

## 2025-02-27 PROCEDURE — 36415 COLL VENOUS BLD VENIPUNCTURE: CPT

## 2025-02-27 PROCEDURE — 99396 PREV VISIT EST AGE 40-64: CPT

## 2025-02-27 RX ORDER — IPRATROPIUM BROMIDE 21 UG/1
0.03 SPRAY, METERED NASAL 3 TIMES DAILY
Qty: 1 | Refills: 1 | Status: ACTIVE | COMMUNITY
Start: 2025-02-27 | End: 1900-01-01

## 2025-02-28 LAB
25(OH)D3 SERPL-MCNC: 64.6 NG/ML
ALBUMIN SERPL ELPH-MCNC: 4.4 G/DL
ALP BLD-CCNC: 105 U/L
ALT SERPL-CCNC: 173 U/L
ANION GAP SERPL CALC-SCNC: 14 MMOL/L
AST SERPL-CCNC: 105 U/L
BASOPHILS # BLD AUTO: 0.08 K/UL
BASOPHILS NFR BLD AUTO: 1.3 %
BILIRUB SERPL-MCNC: 0.9 MG/DL
BUN SERPL-MCNC: 13 MG/DL
CALCIUM SERPL-MCNC: 9.5 MG/DL
CHLORIDE SERPL-SCNC: 100 MMOL/L
CHOLEST SERPL-MCNC: 211 MG/DL
CO2 SERPL-SCNC: 25 MMOL/L
CREAT SERPL-MCNC: 0.9 MG/DL
CREAT SPEC-SCNC: 101 MG/DL
EGFR: 100 ML/MIN/1.73M2
EOSINOPHIL # BLD AUTO: 0.22 K/UL
EOSINOPHIL NFR BLD AUTO: 3.6 %
ESTIMATED AVERAGE GLUCOSE: 148 MG/DL
FOLATE SERPL-MCNC: >20 NG/ML
GLUCOSE SERPL-MCNC: 137 MG/DL
HBA1C MFR BLD HPLC: 6.8 %
HCT VFR BLD CALC: 48.4 %
HDLC SERPL-MCNC: 42 MG/DL
HGB BLD-MCNC: 16 G/DL
IMM GRANULOCYTES NFR BLD AUTO: 0.2 %
LDLC SERPL CALC-MCNC: 137 MG/DL
LYMPHOCYTES # BLD AUTO: 2.31 K/UL
LYMPHOCYTES NFR BLD AUTO: 37.7 %
MAN DIFF?: NORMAL
MCHC RBC-ENTMCNC: 31.2 PG
MCHC RBC-ENTMCNC: 33.1 G/DL
MCV RBC AUTO: 94.3 FL
MICROALBUMIN 24H UR DL<=1MG/L-MCNC: <1.2 MG/DL
MICROALBUMIN/CREAT 24H UR-RTO: NORMAL MG/G
MONOCYTES # BLD AUTO: 0.72 K/UL
MONOCYTES NFR BLD AUTO: 11.7 %
NEUTROPHILS # BLD AUTO: 2.79 K/UL
NEUTROPHILS NFR BLD AUTO: 45.5 %
NONHDLC SERPL-MCNC: 169 MG/DL
PLATELET # BLD AUTO: 177 K/UL
POTASSIUM SERPL-SCNC: 3.7 MMOL/L
PROT SERPL-MCNC: 7.1 G/DL
PSA FREE FLD-MCNC: 17 %
PSA FREE SERPL-MCNC: 0.48 NG/ML
PSA SERPL-MCNC: 2.91 NG/ML
RBC # BLD: 5.13 M/UL
RBC # FLD: 13.1 %
SODIUM SERPL-SCNC: 140 MMOL/L
TRIGL SERPL-MCNC: 176 MG/DL
TSH SERPL-ACNC: 1.91 UIU/ML
VIT B12 SERPL-MCNC: 628 PG/ML
WBC # FLD AUTO: 6.13 K/UL

## 2025-03-03 LAB
APPEARANCE: CLEAR
BILIRUBIN URINE: NEGATIVE
BLOOD URINE: NEGATIVE
COLOR: YELLOW
GLUCOSE QUALITATIVE U: >=1000 MG/DL
KETONES URINE: NEGATIVE MG/DL
LEUKOCYTE ESTERASE URINE: NEGATIVE
NITRITE URINE: NEGATIVE
PH URINE: 5.5
PROTEIN URINE: NEGATIVE MG/DL
SPECIFIC GRAVITY URINE: >1.03
UROBILINOGEN URINE: 0.2 MG/DL

## 2025-03-04 ENCOUNTER — NON-APPOINTMENT (OUTPATIENT)
Age: 60
End: 2025-03-04

## 2025-03-04 RX ORDER — ATORVASTATIN CALCIUM 10 MG/1
10 TABLET, FILM COATED ORAL
Qty: 90 | Refills: 0 | Status: ACTIVE | COMMUNITY
Start: 2025-03-04 | End: 1900-01-01

## 2025-03-06 RX ORDER — TIRZEPATIDE 5 MG/.5ML
5 INJECTION, SOLUTION SUBCUTANEOUS
Qty: 3 | Refills: 1 | Status: ACTIVE | COMMUNITY
Start: 2025-02-27

## 2025-04-08 NOTE — OCCUPATIONAL THERAPY INITIAL EVALUATION ADULT - BALANCE TRAINING, PT EVAL
d/w acp Pt will increase dynamic standing balance to normal in 2 weeks to increase participation in ADLs.

## 2025-04-29 ENCOUNTER — TRANSCRIPTION ENCOUNTER (OUTPATIENT)
Age: 60
End: 2025-04-29

## 2025-06-05 ENCOUNTER — TRANSCRIPTION ENCOUNTER (OUTPATIENT)
Age: 60
End: 2025-06-05

## 2025-06-13 ENCOUNTER — RX RENEWAL (OUTPATIENT)
Age: 60
End: 2025-06-13

## 2025-06-13 ENCOUNTER — APPOINTMENT (OUTPATIENT)
Dept: INTERNAL MEDICINE | Facility: CLINIC | Age: 60
End: 2025-06-13
Payer: COMMERCIAL

## 2025-06-13 VITALS
DIASTOLIC BLOOD PRESSURE: 88 MMHG | HEIGHT: 69 IN | RESPIRATION RATE: 16 BRPM | BODY MASS INDEX: 31.99 KG/M2 | WEIGHT: 216 LBS | OXYGEN SATURATION: 97 % | HEART RATE: 96 BPM | SYSTOLIC BLOOD PRESSURE: 138 MMHG | TEMPERATURE: 98 F

## 2025-06-13 PROCEDURE — G2211 COMPLEX E/M VISIT ADD ON: CPT | Mod: NC

## 2025-06-13 PROCEDURE — 36415 COLL VENOUS BLD VENIPUNCTURE: CPT

## 2025-06-13 PROCEDURE — 99214 OFFICE O/P EST MOD 30 MIN: CPT

## 2025-06-13 RX ORDER — PREDNISONE 10 MG/1
10 TABLET ORAL
Qty: 26 | Refills: 0 | Status: ACTIVE | COMMUNITY
Start: 2025-06-13 | End: 1900-01-01

## 2025-06-13 RX ORDER — METHYLPREDNISOLONE 4 MG/1
4 TABLET ORAL
Qty: 1 | Refills: 0 | Status: ACTIVE | COMMUNITY
Start: 2025-06-13 | End: 1900-01-01

## 2025-06-16 LAB
ALBUMIN SERPL ELPH-MCNC: 4.6 G/DL
ALP BLD-CCNC: 114 U/L
ALT SERPL-CCNC: 398 U/L
ANION GAP SERPL CALC-SCNC: 16 MMOL/L
AST SERPL-CCNC: 218 U/L
BILIRUB SERPL-MCNC: 0.8 MG/DL
BUN SERPL-MCNC: 14 MG/DL
CALCIUM SERPL-MCNC: 10.1 MG/DL
CHLORIDE SERPL-SCNC: 103 MMOL/L
CHOLEST SERPL-MCNC: 190 MG/DL
CO2 SERPL-SCNC: 22 MMOL/L
CREAT SERPL-MCNC: 1.02 MG/DL
EGFRCR SERPLBLD CKD-EPI 2021: 86 ML/MIN/1.73M2
ESTIMATED AVERAGE GLUCOSE: 146 MG/DL
FERRITIN SERPL-MCNC: 584 NG/ML
GLUCOSE SERPL-MCNC: 172 MG/DL
HBA1C MFR BLD HPLC: 6.7 %
HDLC SERPL-MCNC: 49 MG/DL
LDLC SERPL-MCNC: 100 MG/DL
NONHDLC SERPL-MCNC: 141 MG/DL
POTASSIUM SERPL-SCNC: 3.9 MMOL/L
PROT SERPL-MCNC: 7.5 G/DL
PSA SERPL-MCNC: 2.53 NG/ML
SODIUM SERPL-SCNC: 141 MMOL/L
TRIGL SERPL-MCNC: 242 MG/DL

## 2025-06-25 ENCOUNTER — APPOINTMENT (OUTPATIENT)
Dept: INTERNAL MEDICINE | Facility: CLINIC | Age: 60
End: 2025-06-25

## 2025-06-26 LAB
ALBUMIN SERPL ELPH-MCNC: 4.3 G/DL
ALP BLD-CCNC: 103 U/L
ALT SERPL-CCNC: 210 U/L
ANION GAP SERPL CALC-SCNC: 13 MMOL/L
AST SERPL-CCNC: 102 U/L
BILIRUB SERPL-MCNC: 0.7 MG/DL
BUN SERPL-MCNC: 15 MG/DL
CALCIUM SERPL-MCNC: 9.6 MG/DL
CHLORIDE SERPL-SCNC: 102 MMOL/L
CO2 SERPL-SCNC: 21 MMOL/L
CREAT SERPL-MCNC: 0.8 MG/DL
EGFRCR SERPLBLD CKD-EPI 2021: 103 ML/MIN/1.73M2
GLUCOSE SERPL-MCNC: 155 MG/DL
POTASSIUM SERPL-SCNC: 3.7 MMOL/L
PROT SERPL-MCNC: 7.1 G/DL
SODIUM SERPL-SCNC: 136 MMOL/L

## 2025-09-15 ENCOUNTER — RX RENEWAL (OUTPATIENT)
Age: 60
End: 2025-09-15

## 2025-09-16 ENCOUNTER — TRANSCRIPTION ENCOUNTER (OUTPATIENT)
Age: 60
End: 2025-09-16

## 2025-09-17 ENCOUNTER — NON-APPOINTMENT (OUTPATIENT)
Age: 60
End: 2025-09-17

## 2025-09-18 ENCOUNTER — APPOINTMENT (OUTPATIENT)
Dept: ORTHOPEDIC SURGERY | Facility: CLINIC | Age: 60
End: 2025-09-18

## 2025-09-18 VITALS — HEIGHT: 69 IN | BODY MASS INDEX: 31.99 KG/M2 | WEIGHT: 216 LBS

## 2025-09-19 ENCOUNTER — APPOINTMENT (OUTPATIENT)
Dept: INTERNAL MEDICINE | Facility: CLINIC | Age: 60
End: 2025-09-19

## 2025-09-19 VITALS
RESPIRATION RATE: 16 BRPM | WEIGHT: 216 LBS | HEIGHT: 69 IN | SYSTOLIC BLOOD PRESSURE: 140 MMHG | HEART RATE: 88 BPM | OXYGEN SATURATION: 97 % | TEMPERATURE: 98 F | BODY MASS INDEX: 31.99 KG/M2 | DIASTOLIC BLOOD PRESSURE: 90 MMHG

## 2025-09-19 DIAGNOSIS — R74.01 ELEVATION OF LEVELS OF LIVER TRANSAMINASE LEVELS: ICD-10-CM

## 2025-09-19 DIAGNOSIS — I10 ESSENTIAL (PRIMARY) HYPERTENSION: ICD-10-CM

## 2025-09-19 DIAGNOSIS — E78.5 TYPE 2 DIABETES MELLITUS WITH OTHER SPECIFIED COMPLICATION: ICD-10-CM

## 2025-09-19 DIAGNOSIS — L23.7 ALLERGIC CONTACT DERMATITIS DUE TO PLANTS, EXCEPT FOOD: ICD-10-CM

## 2025-09-19 DIAGNOSIS — E11.69 TYPE 2 DIABETES MELLITUS WITH OTHER SPECIFIED COMPLICATION: ICD-10-CM

## 2025-09-19 DIAGNOSIS — E11.9 TYPE 2 DIABETES MELLITUS W/OUT COMPLICATIONS: ICD-10-CM

## 2025-09-19 RX ORDER — CHLORTHALIDONE 25 MG/1
25 TABLET ORAL
Qty: 90 | Refills: 1 | Status: ACTIVE | COMMUNITY
Start: 2025-09-19 | End: 1900-01-01

## 2025-09-23 LAB
ALBUMIN SERPL ELPH-MCNC: 4.7 G/DL
ALP BLD-CCNC: 92 U/L
ALT SERPL-CCNC: 157 U/L
ANION GAP SERPL CALC-SCNC: 12 MMOL/L
AST SERPL-CCNC: 94 U/L
BILIRUB SERPL-MCNC: 0.8 MG/DL
BUN SERPL-MCNC: 12 MG/DL
CALCIUM SERPL-MCNC: 9.7 MG/DL
CHLORIDE SERPL-SCNC: 103 MMOL/L
CHOLEST SERPL-MCNC: 240 MG/DL
CO2 SERPL-SCNC: 24 MMOL/L
CREAT SERPL-MCNC: 0.77 MG/DL
EGFRCR SERPLBLD CKD-EPI 2021: 104 ML/MIN/1.73M2
ESTIMATED AVERAGE GLUCOSE: 126 MG/DL
GLUCOSE SERPL-MCNC: 124 MG/DL
HBA1C MFR BLD HPLC: 6 %
HDLC SERPL-MCNC: 47 MG/DL
LDLC SERPL-MCNC: 157 MG/DL
NONHDLC SERPL-MCNC: 193 MG/DL
POTASSIUM SERPL-SCNC: 4 MMOL/L
PROT SERPL-MCNC: 7.4 G/DL
SODIUM SERPL-SCNC: 138 MMOL/L
TRIGL SERPL-MCNC: 200 MG/DL

## (undated) DEVICE — SAW BLADE STRYKER SAGITTAL EXTRA WIDE THIN SHORT

## (undated) DEVICE — CRYO/CUFF GRAVITY COOLER KNEE LARGE

## (undated) DEVICE — SYR LUER LOK 20CC

## (undated) DEVICE — PREP CHLORAPREP HI-LITE ORANGE 26ML

## (undated) DEVICE — HOOD T5 PEELAWAY

## (undated) DEVICE — SUCTION YANKAUER TAPERED BULBOUS NO VENT

## (undated) DEVICE — MAKO CHECKPOINT KIT FEMORAL / TIBIAL

## (undated) DEVICE — SAW BLADE STRYKER SAGITTAL DUAL CUT 18X90X1.19MM

## (undated) DEVICE — SUT VICRYL 2-0 27" CT-1 UNDYED

## (undated) DEVICE — MEDICATION LABELS W MARKER

## (undated) DEVICE — SOL IRR BAG NS 0.9% 3000ML

## (undated) DEVICE — MARKING PEN W RULER

## (undated) DEVICE — DRSG WEBRIL 6"

## (undated) DEVICE — DRSG PICO NPWT 4X12"

## (undated) DEVICE — DRAPE EXTREMITY 87" X 128.5"

## (undated) DEVICE — MAKO BLADE STANDARD

## (undated) DEVICE — PREP SCRUB BRUSH W CHG 4%

## (undated) DEVICE — SUT STRATAFIX SPIRAL MONOCRYL PLUS 4-0 45CM PS-2 UNDYED

## (undated) DEVICE — VENODYNE/SCD SLEEVE CALF MEDIUM

## (undated) DEVICE — DRSG DERMABOND PRINEO 22CM

## (undated) DEVICE — WOUND IRR IRRISEPT W 0.5 CHG

## (undated) DEVICE — ELCTR GROUNDING PAD ADULT COVIDIEN

## (undated) DEVICE — FRA-ESU BOVIE FORCE TRIAD T7J19731DX: Type: DURABLE MEDICAL EQUIPMENT

## (undated) DEVICE — DRAPE TOWEL BLUE 17" X 24"

## (undated) DEVICE — GOWN XL

## (undated) DEVICE — NDL HYPO SAFE 18G X 1.5" (PINK)

## (undated) DEVICE — DRAPE INSTRUMENT POUCH 6.75" X 11"

## (undated) DEVICE — ZIMMER PULSAVAC PLUS FAN KIT

## (undated) DEVICE — DRAPE IOBAN 33" X 23"

## (undated) DEVICE — DRAPE U 47X51" LF STERILE

## (undated) DEVICE — WARMING BLANKET UPPER ADULT

## (undated) DEVICE — SUT VICRYL 0 27" CT-1 UNDYED

## (undated) DEVICE — MIXER BONE CEMENT EVAC III

## (undated) DEVICE — PACK BASIC

## (undated) DEVICE — SUT PDO 2 1/2 CIRCLE 40MM NDL 45CM

## (undated) DEVICE — DRAPE 3/4 SHEET W REINFORCEMENT 56X77"